# Patient Record
Sex: FEMALE | Race: WHITE | NOT HISPANIC OR LATINO | Employment: STUDENT | URBAN - METROPOLITAN AREA
[De-identification: names, ages, dates, MRNs, and addresses within clinical notes are randomized per-mention and may not be internally consistent; named-entity substitution may affect disease eponyms.]

---

## 2021-07-21 ENCOUNTER — TELEPHONE (OUTPATIENT)
Dept: NEUROLOGY | Facility: CLINIC | Age: 21
End: 2021-07-21

## 2021-07-21 NOTE — TELEPHONE ENCOUNTER
Best contact number for patient:  [phone]  Emergency Contact name and number:  [phone]  Referring provider and telephone number:  Self referral  Primary Care Provider Name and if affiliated with [facility]:     Reason for Appointment/Dx:migraines    Have you seen and followed up with a pediatric Neurologist for this disease in the past?      No (If yes ok to schedule with Dr Camacho Ellington)    Neurology Location patient would like to be seen:    Order received? No                                                 Records Received? No  If no, explain: Pt mom is getting records from Dr Sushant Mendez & PCP    Have you ever seen another Neurologist?       Yes, Dr Ham Tapia Name:    ID/Policy #:    Secondary Insurance:    ID/Policy#: Workman's Comp/ Accident/ School  Information      Workman's Comp/Accident/School related? No    If yes name of Insurance company:    Claim #:    Date of Injury:    Type of Injury:     Name and Telephone Number:    Notes:                   Appointment date: 3/30/22 @ 11am, w/Dr Erin Vazquez in [facility]  Verified insurance/address/phone-I made the pt chart  Sent appt details/directions  Mom will get neurology records & PCP records, I gave our fax number and indicated the info will all be needed

## 2021-08-26 ENCOUNTER — OFFICE VISIT (OUTPATIENT)
Dept: URGENT CARE | Facility: CLINIC | Age: 21
End: 2021-08-26
Payer: COMMERCIAL

## 2021-08-26 ENCOUNTER — ANNUAL EXAM (OUTPATIENT)
Dept: OBGYN CLINIC | Facility: CLINIC | Age: 21
End: 2021-08-26
Payer: COMMERCIAL

## 2021-08-26 VITALS
WEIGHT: 154 LBS | RESPIRATION RATE: 16 BRPM | OXYGEN SATURATION: 99 % | TEMPERATURE: 98.6 F | HEART RATE: 80 BPM | DIASTOLIC BLOOD PRESSURE: 68 MMHG | SYSTOLIC BLOOD PRESSURE: 106 MMHG | BODY MASS INDEX: 25.83 KG/M2

## 2021-08-26 VITALS
DIASTOLIC BLOOD PRESSURE: 78 MMHG | WEIGHT: 154.2 LBS | SYSTOLIC BLOOD PRESSURE: 108 MMHG | BODY MASS INDEX: 25.69 KG/M2 | HEIGHT: 65 IN

## 2021-08-26 DIAGNOSIS — L03.012 PARONYCHIA OF FINGER OF LEFT HAND: Primary | ICD-10-CM

## 2021-08-26 DIAGNOSIS — N94.6 DYSMENORRHEA: ICD-10-CM

## 2021-08-26 DIAGNOSIS — G43.901 MIGRAINE WITH STATUS MIGRAINOSUS, NOT INTRACTABLE, UNSPECIFIED MIGRAINE TYPE: ICD-10-CM

## 2021-08-26 DIAGNOSIS — Z30.011 ENCOUNTER FOR PRESCRIPTION OF ORAL CONTRACEPTIVES: ICD-10-CM

## 2021-08-26 DIAGNOSIS — F32.A DEPRESSION, UNSPECIFIED DEPRESSION TYPE: ICD-10-CM

## 2021-08-26 DIAGNOSIS — Z01.419 ENCOUNTER FOR GYNECOLOGICAL EXAMINATION WITHOUT ABNORMAL FINDING: Primary | ICD-10-CM

## 2021-08-26 DIAGNOSIS — L30.9 ECZEMA, UNSPECIFIED TYPE: ICD-10-CM

## 2021-08-26 PROCEDURE — 99395 PREV VISIT EST AGE 18-39: CPT | Performed by: NURSE PRACTITIONER

## 2021-08-26 PROCEDURE — G0382 LEV 3 HOSP TYPE B ED VISIT: HCPCS | Performed by: PHYSICIAN ASSISTANT

## 2021-08-26 RX ORDER — FLUOXETINE 10 MG/1
10 CAPSULE ORAL DAILY
Qty: 60 CAPSULE | Refills: 3 | Status: SHIPPED | OUTPATIENT
Start: 2021-08-26 | End: 2022-06-27

## 2021-08-26 RX ORDER — EPINEPHRINE 0.3 MG/.3ML
INJECTION SUBCUTANEOUS AS NEEDED
COMMUNITY
Start: 2021-08-23

## 2021-08-26 RX ORDER — NORETHINDRONE ACETATE/ETHINYL ESTRADIOL AND FERROUS FUMARATE 1MG-20(24)
1 KIT ORAL DAILY
COMMUNITY
Start: 2021-07-20 | End: 2021-08-26 | Stop reason: SDUPTHER

## 2021-08-26 RX ORDER — MONTELUKAST SODIUM 5 MG/1
1 TABLET, CHEWABLE ORAL
COMMUNITY
End: 2022-06-27

## 2021-08-26 RX ORDER — BECLOMETHASONE DIPROPIONATE HFA 80 UG/1
1 AEROSOL, METERED RESPIRATORY (INHALATION) DAILY
COMMUNITY
Start: 2021-08-24 | End: 2022-06-27

## 2021-08-26 RX ORDER — CETIRIZINE HYDROCHLORIDE 10 MG/1
1 TABLET ORAL DAILY
COMMUNITY

## 2021-08-26 RX ORDER — ELETRIPTAN HYDROBROMIDE 40 MG/1
1 TABLET, FILM COATED ORAL AS NEEDED
COMMUNITY
Start: 2021-07-30 | End: 2022-06-27

## 2021-08-26 RX ORDER — RIZATRIPTAN BENZOATE 10 MG/1
1 TABLET ORAL AS NEEDED
COMMUNITY
Start: 2021-06-16 | End: 2022-06-27

## 2021-08-26 RX ORDER — NORETHINDRONE ACETATE/ETHINYL ESTRADIOL AND FERROUS FUMARATE 1MG-20(24)
1 KIT ORAL DAILY
Qty: 84 TABLET | Refills: 3 | Status: SHIPPED | OUTPATIENT
Start: 2021-08-26 | End: 2022-02-20 | Stop reason: SDUPTHER

## 2021-08-26 RX ORDER — TRIAMCINOLONE ACETONIDE 5 MG/G
CREAM TOPICAL 3 TIMES DAILY
Qty: 30 G | Refills: 0 | Status: SHIPPED | OUTPATIENT
Start: 2021-08-26

## 2021-08-26 RX ORDER — CEPHALEXIN 500 MG/1
500 CAPSULE ORAL EVERY 8 HOURS SCHEDULED
Qty: 21 CAPSULE | Refills: 0 | Status: SHIPPED | OUTPATIENT
Start: 2021-08-26 | End: 2021-09-02

## 2021-08-26 NOTE — PATIENT INSTRUCTIONS
Pap smear to start at age 25 as per ASCCP guidelines  GC/CT cultures annually once sexually active, always condom use when sexually active, birth control as directed Use seat belt in every car ride, avoid smoking and alcohol use, exercise most days of the week, obtain appropriate nutrition and hydration, follow up with PCP for appropriate vaccine schedule    Monthly breast self exam to start at age 24  Moderate to severe depression  Some SI  With pt permission spoke with mom about PHQ 9 results  Discussed SSRI she wants to start To start 1 daily increase to 2 daily after 1 week  Recommend counseling at school  Discussed crisi intervention Going to ER or calling 911 with suicidal ideation   Return for follow up in 3 months

## 2021-08-26 NOTE — PROGRESS NOTES
[facility]        NAME: Catarina Mcdowell is a 24 y o  female  : 2000    MRN: [id number]  DATE: 2021  TIME: 6:01 PM    Assessment and Plan   Paronychia of finger of left hand [L03 012]  1  Paronychia of finger of left hand  cephalexin (KEFLEX) 500 mg capsule   2  Eczema, unspecified type  triamcinolone (KENALOG) 0 5 % cream         Patient Instructions      take antibiotic as directed until completed   use cream as directed  Follow up with PCP in 3-5 days  Proceed to  ER if symptoms worsen  Chief Complaint     Chief Complaint   Patient presents with    Finger Pain     began one week ago  base of 4th finger nail         History of Present Illness        23-year-old female presents with 2 complaints  First complaint is swollen left ring finger at the distal portion  Has been waxing waning for the past week  Has been doing soaks on it  Has expressed some drainage from the area  Denies any fevers or chills  Second complaint is flare-up of hand eczema  Has been using over-the-counter remedies with minimal relief  Rash  This is a new problem  The current episode started more than 1 year ago  The problem has been waxing and waning since onset  Location: Bilateral hands  The rash is characterized by itchiness  She was exposed to nothing  Pertinent negatives include no cough, fatigue or fever  Past treatments include topical steroids  The treatment provided no relief  Hand Pain   The incident occurred 5 to 7 days ago  The incident occurred at home  There was no injury mechanism  Pain location: Left ring finger  The quality of the pain is described as aching  The pain does not radiate  The pain is moderate  The pain has been fluctuating since the incident  Pertinent negatives include no chest pain, muscle weakness, numbness or tingling  The symptoms are aggravated by palpation and movement  She has tried rest for the symptoms  The treatment provided no relief  Review of Systems   Review of Systems   Constitutional: Negative  Negative for fatigue and fever  HENT: Negative  Eyes: Negative  Respiratory: Negative  Negative for cough  Cardiovascular: Negative  Negative for chest pain  Gastrointestinal: Negative  Musculoskeletal: Negative  Skin: Positive for rash  Neurological: Negative  Negative for tingling and numbness           Current Medications       Current Outpatient Medications:     cephalexin (KEFLEX) 500 mg capsule, Take 1 capsule (500 mg total) by mouth every 8 (eight) hours for 7 days, Disp: 21 capsule, Rfl: 0    cetirizine (ZyrTEC) 10 mg tablet, Take 1 tablet by mouth daily, Disp: , Rfl:     eletriptan (RELPAX) 40 MG tablet, Take 1 tablet by mouth as needed, Disp: , Rfl:     EPINEPHrine (EPIPEN) 0 3 mg/0 3 mL SOAJ, as needed, Disp: , Rfl:     FLUoxetine (PROzac) 10 mg capsule, Take 1 capsule (10 mg total) by mouth daily 1 now increase to 2 daily after 1 week, Disp: 60 capsule, Rfl: 3    Isaak 24 FE 1-20 MG-MCG(24) per tablet, Take 1 tablet by mouth daily, Disp: 84 tablet, Rfl: 3    mometasone (ASMANEX TWISTHALER) 220 MCG/INH inhaler, Inhale 1 puff every evening Rinse mouth after use , Disp: , Rfl:     montelukast (SINGULAIR) 5 mg chewable tablet, Chew 1 tablet daily at bedtime, Disp: , Rfl:     Qvar RediHaler 80 MCG/ACT inhaler, Inhale 1 puff daily, Disp: , Rfl:     rizatriptan (MAXALT) 10 MG tablet, Take 1 tablet by mouth as needed, Disp: , Rfl:     Topiramate ER 50 MG CP24, Take 1 capsule by mouth 2 (two) times a day, Disp: , Rfl:     triamcinolone (KENALOG) 0 5 % cream, Apply topically 3 (three) times a day, Disp: 30 g, Rfl: 0    Current Allergies     Allergies as of 08/26/2021 - Reviewed 08/26/2021   Allergen Reaction Noted    Peanut (diagnostic) - food allergy Anaphylaxis 08/26/2021            The following portions of the patient's history were reviewed and updated as appropriate: allergies, current medications, past family history, past medical history, past social history, past surgical history and problem list      Past Medical History:   Diagnosis Date    Asthma     Migraines        History reviewed  No pertinent surgical history  History reviewed  No pertinent family history  Medications have been verified  Objective   /68   Pulse 80   Temp 98 6 °F (37 °C)   Resp 16   Wt 69 9 kg (154 lb)   LMP 08/08/2021 (Approximate)   SpO2 99%   BMI 25 83 kg/m²   Patient's last menstrual period was 08/08/2021 (approximate)  Physical Exam     Physical Exam  Vitals and nursing note reviewed  Constitutional:       General: She is not in acute distress  Appearance: She is well-developed  HENT:      Head: Normocephalic and atraumatic  Right Ear: Hearing, tympanic membrane, ear canal and external ear normal       Left Ear: Hearing, tympanic membrane, ear canal and external ear normal       Nose: Nose normal       Mouth/Throat:      Pharynx: Uvula midline  No oropharyngeal exudate  Eyes:      General:         Right eye: No discharge  Left eye: No discharge  Conjunctiva/sclera: Conjunctivae normal    Cardiovascular:      Rate and Rhythm: Normal rate and regular rhythm  Heart sounds: Normal heart sounds  No murmur heard  Pulmonary:      Effort: Pulmonary effort is normal  No respiratory distress  Breath sounds: Normal breath sounds  No wheezing or rales  Abdominal:      General: Bowel sounds are normal       Palpations: Abdomen is soft  Tenderness: There is no abdominal tenderness  Musculoskeletal:         General: Normal range of motion  Left hand: Swelling and tenderness present  No deformity or lacerations  Normal range of motion  Hands:       Cervical back: Normal range of motion and neck supple  Lymphadenopathy:      Cervical: No cervical adenopathy  Skin:     General: Skin is warm and dry        Findings: Erythema ( to distal phalanx on left ring finger) and rash ( eczema looking rash on bilateral hands) present  Neurological:      Mental Status: She is alert and oriented to person, place, and time

## 2021-08-26 NOTE — PROGRESS NOTES
Assessment/Plan:  Pap smear to start at age 25 as per ASCCP guidelines  GC/CT cultures annually once sexually active, always condom use when sexually active, birth control as directed Use seat belt in every car ride, avoid smoking and alcohol use, exercise most days of the week, obtain appropriate nutrition and hydration, follow up with PCP for appropriate vaccine schedule  Calcium 1300 mg per day to age 25  Age 24-51 calcium 1000mg daily intake  Vit D daily recommended  Monthly breast self exam to start at age 24  Diagnoses and all orders for this visit:    Encounter for gynecological examination without abnormal finding    Encounter for prescription of oral contraceptives  -     Isaak 24 FE 1-20 MG-MCG(24) per tablet; Take 1 tablet by mouth daily    Dysmenorrhea  Comments:  improved on OCP    Migraine with status migrainosus, not intractable, unspecified migraine type  Comments:  no aura    Depression, unspecified depression type  -     FLUoxetine (PROzac) 10 mg capsule; Take 1 capsule (10 mg total) by mouth daily 1 now increase to 2 daily after 1 week    Other orders  -     rizatriptan (MAXALT) 10 MG tablet; Take 1 tablet by mouth as needed  -     Discontinue: Isaak 24 FE 1-20 MG-MCG(24) per tablet; Take 1 tablet by mouth daily  -     cetirizine (ZyrTEC) 10 mg tablet; Take 1 tablet by mouth daily  -     montelukast (SINGULAIR) 5 mg chewable tablet; Chew 1 tablet daily at bedtime  -     mometasone (ASMANEX TWISTHALER) 220 MCG/INH inhaler; Inhale 1 puff every evening Rinse mouth after use  -     Topiramate ER 50 MG CP24; Take 1 capsule by mouth 2 (two) times a day  -     EPINEPHrine (EPIPEN) 0 3 mg/0 3 mL SOAJ; as needed  -     eletriptan (RELPAX) 40 MG tablet; Take 1 tablet by mouth as needed  -     Qvar RediHaler 80 MCG/ACT inhaler; Inhale 1 puff daily          Subjective:      Patient ID: Lexi Isbell is a 24 y o  female  Here for annual gyn Skips periods no spotting or BTB    Denies pain Bowel and bladder are normal  No unusual discharge, Never sexually active  Mood is off depressed and anxious  Not on meds  Has been feeling off awhile  Leaves to go back to college on Sat Started way before that  [employer]  When younger bad anxiety and now on autopiolet Does not feel herself  PHQ 9 Mod to severe depressoin  Supportive  Group of friends at school  Has had suicidal ideation  Agreeable to start antidepressants  PCP Dr Stevie Keita  The following portions of the patient's history were reviewed and updated as appropriate: allergies, current medications, past family history, past medical history, past social history, past surgical history and problem list     Review of Systems   Constitutional: Negative for fatigue and unexpected weight change  Gastrointestinal: Negative for abdominal distention, abdominal pain, constipation and diarrhea  Genitourinary: Negative for difficulty urinating, dyspareunia, dysuria, frequency, genital sores, menstrual problem, pelvic pain, urgency, vaginal bleeding, vaginal discharge and vaginal pain  Neurological: Negative for headaches  Psychiatric/Behavioral: Positive for dysphoric mood and suicidal ideas  The patient is nervous/anxious  Objective:      /78 (BP Location: Left arm, Patient Position: Sitting, Cuff Size: Large)   Ht 5' 4 75" (1 645 m)   Wt 69 9 kg (154 lb 3 2 oz)   LMP 08/08/2021 (Approximate)   Breastfeeding No   BMI 25 86 kg/m²          Physical Exam  Constitutional:       Appearance: Normal appearance  HENT:      Head: Normocephalic and atraumatic  Pulmonary:      Effort: Pulmonary effort is normal    Chest:      Breasts: Adiel Score is 5  Breasts are symmetrical          Right: Normal  No mass, nipple discharge, skin change or tenderness  Left: Normal  No mass, nipple discharge, skin change or tenderness  Abdominal:      General: There is no distension  Palpations: Abdomen is soft  There is no mass  Tenderness: There is no abdominal tenderness  Lymphadenopathy:      Upper Body:      Right upper body: No axillary adenopathy  Left upper body: No axillary adenopathy  Neurological:      General: No focal deficit present  Mental Status: She is alert and oriented to person, place, and time     Psychiatric:         Mood and Affect: Mood normal          Behavior: Behavior normal

## 2021-08-26 NOTE — PATIENT INSTRUCTIONS
take antibiotic as directed until completed   use cream as directed  Follow up with PCP in 3-5 days  Proceed to  ER if symptoms worsen  Paronychia   WHAT YOU NEED TO KNOW:   Paronychia is an infection of your nail fold caused by bacteria or a fungus  The nail fold is the skin around your nail  Paronychia may happen suddenly and last for 6 weeks or longer  You may have paronychia on more than 1 finger or toe  DISCHARGE INSTRUCTIONS:   Medicines:   · Td vaccine  is a booster shot used to help prevent tetanus and diphtheria  The Td booster may be given to adolescents and adults every 10 years or for certain wounds and injuries  · Antibiotics: This medicine will help fight or prevent an infection  It may be given as a pill, cream, or ointment  · Steroids: This medicine will help decrease inflammation  It may be given as a pill, cream, or ointment  · Antifungal medicine: This medicine helps kill fungus that may be causing your infection  It may be given as a cream or ointment  · NSAIDs:  These medicines decrease pain and swelling  NSAIDs are available without a doctor's order  Ask your healthcare provider which medicine is right for you  Ask how much to take and when to take it  Take as directed  NSAIDs can cause stomach bleeding and kidney problems if not taken correctly  · Take your medicine as directed  Contact your healthcare provider if you think your medicine is not helping or if you have side effects  Tell him of her if you are allergic to any medicine  Keep a list of the medicines, vitamins, and herbs you take  Include the amounts, and when and why you take them  Bring the list or the pill bottles to follow-up visits  Carry your medicine list with you in case of an emergency  Follow up with your healthcare provider as directed:  Write down your questions so you remember to ask them during your visits     Self-care:   · Soak your nail:  Soak your nail in a mixture of equal parts vinegar and water 3 or 4 times each day  This will help decrease inflammation  · Apply a warm compress:  Soak a washcloth in warm water and place it on your nail  This will help decrease inflammation  · Elevate:  Raise your nail above the level of your heart as often as you can  This will help decrease swelling and pain  Prop your nail on pillows or blankets to keep it elevated comfortably  · Use lotion:  Apply lotion after you wash your hands  This will prevent your skin from becoming too dry  Prevent paronychia:   · Avoid chemicals and allergens that may harm your skin and nails  This includes soaps, laundry detergents, and nail products  · Keep your nails clean and dry  Avoid soaking your nails in water  Use cotton-lined rubber gloves or wear 2 rubber gloves if you work with food or water  The gloves will help protect your nail folds  · Keep your nails short  Do not bite your nails, pick at your hangnails, suck your fingers, or wear fake nails  Bring your own nail tools when you go to the nail salon  Contact your healthcare provider if:   · Your nail becomes loose, deformed, or falls off  · You have a large abscess on your nail  · You have questions or concerns about your condition or care  Return to the emergency department if:   · You have severe nail pain  · The inflammation spreads to your hand or arm  © Copyright [address] 2021 Information is for End User's use only and may not be sold, redistributed or otherwise used for commercial purposes  All illustrations and images included in CareNotes® are the copyrighted property of [employer] M [employer] Inc  or [employer]  The above information is an  only  It is not intended as medical advice for individual conditions or treatments  Talk to your doctor, nurse or pharmacist before following any medical regimen to see if it is safe and effective for you        Eczema   AMBULATORY CARE:   Eczema  is an itchy, red skin rash  You are more likely to have it if your parent or a family member has eczema, asthma, or hay fever  Eczema is a long-term condition  You may have flare-ups from time to time for the rest of your life  Signs and symptoms:   · Patches of dry, red, itchy skin    · Bumps or blisters that crust over or ooze clear fluid    · Areas of skin that are thick, scaly, or hard and leather-like    Eczema triggers:  Anything that increases dryness or makes you want to scratch is a trigger  Triggers may cause eczema to flare up  The following are common triggers:  · Frequent baths or showers  can lead to dry, itchy skin  · Sudden temperature changes , such as cold air, dries out your skin  Heat can increase sweating  Both can make you itch  · Allergens  such as dust mites and pet dander can make your symptoms worse  Pollen, mold, and cigarette smoke may also irritate your skin  · Some kinds of soap, makeup, and household   may bother your skin  Ask your healthcare provider about mild products you can use  · Stress  may cause your eczema to get worse  Seek care immediately if:   · You develop a fever or have red streaks going up your arm or leg  · Your rash gets more swollen, red, or hot  Call your doctor if:   · Most of your skin is red, swollen, painful, and covered with scales  · You develop bloody, red, painful crusts  · Your skin blisters and oozes white or yellow pus  · You have questions or concerns about your condition or care  Treatment for eczema  is aimed at reducing pain and itching, and adding moisture to your skin  Your symptoms should improve after 3 weeks of treatment  There is no cure for eczema  You may need the following:  · Medicines may help reduce itching, redness, pain, and swelling  They may be given as a cream or pill  You may also receive antibiotics if you have a skin infection  · Phototherapy , or light therapy, may help heal your skin      Care for your skin:   · Do not scratch  Pat or press on your skin to relieve itching  Your symptoms will get worse if you scratch  Keep your fingernails short so you do not tear your skin if you do scratch  · Keep your skin moist   Rub lotion, cream, or ointment into your skin at least 2 times a day  Ask your healthcare provider what to use and how often to use it  · Take baths or showers  with warm water for 10 minutes or less  Use mild bar soap  Ask your healthcare provider for the best soap for you to use  · Wear cotton clothes  Wear loose-fitting clothes made from cotton or cotton blends  Avoid wool  · Use a humidifier  to add moisture to the air in your home  · Avoid changes in temperature , especially activities that cause you to sweat a lot  Sweat can cause itching  Remove blankets from your bed if you get hot while you sleep  · Avoid allergens, dust, and skin irritants  Do not use perfume, fabric softener, or makeup that burns or itches  Follow up with your doctor as directed:  Write down your questions so you remember to ask them during your visits  © Copyright [employer] 2021 Information is for End User's use only and may not be sold, redistributed or otherwise used for commercial purposes  All illustrations and images included in CareNotes® are the copyrighted property of A D [employer] M [employer] Inc  or [employer]  The above information is an  only  It is not intended as medical advice for individual conditions or treatments  Talk to your doctor, nurse or pharmacist before following any medical regimen to see if it is safe and effective for you

## 2022-02-20 DIAGNOSIS — Z30.011 ENCOUNTER FOR PRESCRIPTION OF ORAL CONTRACEPTIVES: ICD-10-CM

## 2022-02-22 ENCOUNTER — TELEPHONE (OUTPATIENT)
Dept: NEUROLOGY | Facility: CLINIC | Age: 22
End: 2022-02-22

## 2022-02-22 RX ORDER — NORETHINDRONE ACETATE/ETHINYL ESTRADIOL AND FERROUS FUMARATE 1MG-20(24)
1 KIT ORAL DAILY
Qty: 84 TABLET | Refills: 0 | Status: SHIPPED | OUTPATIENT
Start: 2022-02-22 | End: 2022-04-19 | Stop reason: SDUPTHER

## 2022-02-22 NOTE — TELEPHONE ENCOUNTER
LVMM explaining Dr Agusto Olmedo not be in office on 3/30 and we need to reschedule appt   Gave direct # for today and central # for return call to reschedule      Called Mother and left VMM as above

## 2022-03-02 ENCOUNTER — TELEPHONE (OUTPATIENT)
Dept: NEUROLOGY | Facility: CLINIC | Age: 22
End: 2022-03-02

## 2022-03-02 NOTE — TELEPHONE ENCOUNTER
Called and left message for patient appt was rescheduled for 3/10/2022 @1:00pm with Eusebia Mckeon in [facility] Aria Marcus would not be in office   Gave central # if unable to keep appt

## 2022-03-03 ENCOUNTER — TELEPHONE (OUTPATIENT)
Dept: NEUROLOGY | Facility: CLINIC | Age: 22
End: 2022-03-03

## 2022-03-03 NOTE — TELEPHONE ENCOUNTER
Called and left a voicemail for patient - Please call back to confirm upcoming appointment with [facility]  Provided patient with apt date, time and location  Informed patient that check in is at least 15 minutes prior to apt time

## 2022-04-19 DIAGNOSIS — Z30.011 ENCOUNTER FOR PRESCRIPTION OF ORAL CONTRACEPTIVES: ICD-10-CM

## 2022-04-19 RX ORDER — NORETHINDRONE ACETATE/ETHINYL ESTRADIOL AND FERROUS FUMARATE 1MG-20(24)
1 KIT ORAL DAILY
Qty: 84 TABLET | Refills: 0 | Status: SHIPPED | OUTPATIENT
Start: 2022-04-19

## 2022-06-18 LAB — EXT SARS-COV-2: NEGATIVE

## 2022-06-27 ENCOUNTER — OFFICE VISIT (OUTPATIENT)
Dept: FAMILY MEDICINE CLINIC | Facility: CLINIC | Age: 22
End: 2022-06-27
Payer: COMMERCIAL

## 2022-06-27 VITALS
DIASTOLIC BLOOD PRESSURE: 80 MMHG | TEMPERATURE: 99.9 F | WEIGHT: 171 LBS | OXYGEN SATURATION: 95 % | HEIGHT: 66 IN | SYSTOLIC BLOOD PRESSURE: 122 MMHG | HEART RATE: 123 BPM | BODY MASS INDEX: 27.48 KG/M2

## 2022-06-27 DIAGNOSIS — Z13.29 SCREENING FOR THYROID DISORDER: ICD-10-CM

## 2022-06-27 DIAGNOSIS — Z13.0 SCREENING FOR DEFICIENCY ANEMIA: ICD-10-CM

## 2022-06-27 DIAGNOSIS — G43.109 MIGRAINE WITH AURA AND WITHOUT STATUS MIGRAINOSUS, NOT INTRACTABLE: ICD-10-CM

## 2022-06-27 DIAGNOSIS — F41.9 ANXIETY: ICD-10-CM

## 2022-06-27 DIAGNOSIS — J30.9 ALLERGIC RHINITIS, UNSPECIFIED SEASONALITY, UNSPECIFIED TRIGGER: ICD-10-CM

## 2022-06-27 DIAGNOSIS — J45.40 MODERATE PERSISTENT ASTHMA WITHOUT COMPLICATION: ICD-10-CM

## 2022-06-27 DIAGNOSIS — Z13.1 SCREENING FOR DIABETES MELLITUS: ICD-10-CM

## 2022-06-27 DIAGNOSIS — Z00.00 ANNUAL PHYSICAL EXAM: Primary | ICD-10-CM

## 2022-06-27 DIAGNOSIS — Z13.6 SCREENING FOR CARDIOVASCULAR CONDITION: ICD-10-CM

## 2022-06-27 DIAGNOSIS — F32.A DEPRESSION, UNSPECIFIED DEPRESSION TYPE: ICD-10-CM

## 2022-06-27 PROCEDURE — 99385 PREV VISIT NEW AGE 18-39: CPT | Performed by: FAMILY MEDICINE

## 2022-06-27 RX ORDER — ALBUTEROL SULFATE 90 UG/1
AEROSOL, METERED RESPIRATORY (INHALATION)
COMMUNITY
Start: 2022-06-27

## 2022-06-27 RX ORDER — BUPROPION HYDROCHLORIDE 300 MG/1
300 TABLET ORAL EVERY MORNING
COMMUNITY
Start: 2022-06-24

## 2022-06-27 RX ORDER — BUDESONIDE AND FORMOTEROL FUMARATE DIHYDRATE 160; 4.5 UG/1; UG/1
AEROSOL RESPIRATORY (INHALATION)
COMMUNITY
Start: 2022-06-27

## 2022-06-27 RX ORDER — ARIPIPRAZOLE 5 MG/1
5 TABLET ORAL EVERY MORNING
COMMUNITY
Start: 2022-06-24

## 2022-06-27 RX ORDER — DULOXETIN HYDROCHLORIDE 60 MG/1
60 CAPSULE, DELAYED RELEASE ORAL DAILY
COMMUNITY
Start: 2022-06-24

## 2022-06-27 RX ORDER — TOPIRAMATE 50 MG/1
50 TABLET, FILM COATED ORAL 2 TIMES DAILY
COMMUNITY
Start: 2022-05-31

## 2022-06-27 RX ORDER — HYDROXYZINE 50 MG/1
TABLET, FILM COATED ORAL
COMMUNITY
Start: 2022-06-24

## 2022-06-27 NOTE — PATIENT INSTRUCTIONS

## 2022-06-27 NOTE — PROGRESS NOTES
ADULT ANNUAL [facility]    NAME: Micah Madden  AGE: 22 y o  SEX: female  : 2000     DATE: 2022     Assessment and Plan:     Problem List Items Addressed This Visit        Cardiovascular and Mediastinum    Migraine with aura and without status migrainosus, not intractable    Relevant Medications    buPROPion (WELLBUTRIN XL) 300 mg 24 hr tablet    DULoxetine (CYMBALTA) 60 mg delayed release capsule    topiramate (TOPAMAX) 50 MG tablet    Other Relevant Orders    Ambulatory Referral to Neurology    Patient follows with Dr Jewel Diaz and was recently there to see him  Taking topamax for migraine prophylaxis  She states that he changed her abortive medication but not sure what the name of her medication is  She has not filled prescription  Will call for records          Other    Depression    Relevant Medications    ARIPiprazole (ABILIFY) 5 mg tablet    buPROPion (WELLBUTRIN XL) 300 mg 24 hr tablet    DULoxetine (CYMBALTA) 60 mg delayed release capsule    hydrOXYzine HCL (ATARAX) 50 mg tablet  Follows with psychiatry in [place] [address]  She is stable on current meds  Anxiety  Stable  Asthma  Patient follows with allergist and had visit recently (since her pneumonia diagnosis)  States that allergist increased her symbicort dose to 2 puffs bid and gave her spacer to use with her inhaler  Will obtain copy of her records from their office  Other Visit Diagnoses     Annual physical exam    -  Primary  Healthy young adult  We discussed importance of healthy diet and exercise  Encouraged her to get out and do a little walking this summer  No immunization records available for review but she states that they are up to date  Will obtain from pediatrician office  She received covid vaccine at local pharmacy  She declines hep c and HIV screening  She is agreeable to having lipid panel done     Requests cbc to screen for anemia  Screening for cardiovascular condition        Relevant Orders    Lipid panel    Screening for deficiency anemia        Relevant Orders    CBC and differential    Screening for diabetes mellitus        Relevant Orders    Comprehensive metabolic panel    Screening for thyroid disorder        Relevant Orders    TSH, 3rd generation with Free T4 reflex          Immunizations and preventive care screenings were discussed with patient today  Appropriate education was printed on patient's after visit summary  Counseling:  Exercise: the importance of regular exercise/physical activity was discussed  Recommend exercise 3-5 times per week for at least 30 minutes  Return in about 1 year (around 6/27/2023) for Annual physical      Chief Complaint:     Chief Complaint   Patient presents with   [address] [place] [address] Patient Visit      History of Present Illness:     Adult Annual Physical   Patient here as a new patient for a comprehensive physical exam  The patient reports no problems  Previous PCP was her pediatrician     She goes to [employer]  Entering senior year  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    Has depression and anxiety  States that she was diagnosed in the fall of 2021  Sees psychiatrist in [address] [place] [address]  Currently on wellbutrin   Mg daily, abilify 5 mg daily, and cymbalta 60 mg daily  Doing okay  Does have f/u appt scheduled  PHQ-2/9 Depression Screening    Little interest or pleasure in doing things: 1 - several days  Feeling down, depressed, or hopeless: 1 - several days  PHQ-2 Score: 2  PHQ-2 Interpretation: Negative depression screen       KOKI-7 Flowsheet Screening    Flowsheet Row Most Recent Value   Over the last 2 weeks, how often have you been bothered by any of the following problems?     Feeling nervous, anxious, or on edge 0   Not being able to stop or control worrying 0   Worrying too much about different things 1   Trouble relaxing 1   Being so restless that it is hard to sit still 0   Becoming easily annoyed or irritable 2   Feeling afraid as if something awful might happen 2   KOKI-7 Total Score 6            Has asthma and allergies  Sees asthma and allergy specialists in [place]  Currently on zyrtec for her perennial allergic rhinitis  On symbicort bid and has albuterol inhaler  She last used her albuterol inhaler today  She states that she is just getting over pneumonia  Seen at urgent care in [place] and diagnosed based on imaging  No record of this visit for review today  Will call for that progress note along with xray report  Is feeling better  Still some cough and is a little short of breath  covid test was negative  She just saw allergist who gave her spacer and upped her symbicort to 2 puffs bid     Has migraines  Diagnosed at age 17  Gets aura (squiggly vision) prior to onset of her headaches  I saw that she had appt scheduled with [facility] neurology on 3/30/22 but this was "canceled by provider"  She does have neurologist who she has been seeing--Dr Drew Reynolds  Just had an appt  She is taking topamax 50 mg bid for headache prophylaxis  She is not sure what her abortive medication is but states that it is new  She has not taken it yet because insurance would not cover  She has not let neurology know that med was not covered  She will need referral to new [facility] neurology as mother works for [email or web address]    She has no concerns with her health  Thinks immunizations are up to date  She had covid vaccine at pharmacy  She has never had cholesterol checked and is agreeable to having this done for screening purposes  She would like to be screened for anemia  No prior history of anemia  Does have history of heavy periods  Diet and Physical Activity  Diet/Nutrition: eats healthy when she is at home  admits to not eating the healthiest as school  Exercise: no formal exercise        Depression Screening  PHQ-2/9 Depression Screening    Little interest or pleasure in doing things: 1 - several days  Feeling down, depressed, or hopeless: 1 - several days  PHQ-2 Score: 2  PHQ-2 Interpretation: Negative depression screen       General Health  Sleep: sleeps well  Hearing: normal - bilateral   Vision: no vision problems  Dental: regular dental visits  /GYN Health  Last menstrual period: may 2022  She states that she often will skp placebo week of her oral contraceptives  On them for heavy menses  Not sexually active  Contraceptive method: oral contraceptives  History of STDs?: no      Review of Systems:     Review of Systems   Past Medical History:     Past Medical History:   Diagnosis Date    Anxiety     Asthma     Depression     Migraines       Past Surgical History:     History reviewed  No pertinent surgical history     Social History:     Social History     Socioeconomic History    Marital status: Single     Spouse name: None    Number of children: None    Years of education: None    Highest education level: None   Occupational History    Occupation: Student   Tobacco Use    Smoking status: Never Smoker    Smokeless tobacco: Never Used   Vaping Use    Vaping Use: Never used   Substance and Sexual Activity    Alcohol use: Never     Comment: Does not drink alcohol - As per [employer] Person     Drug use: Never     Comment: No - As per [employer]     Sexual activity: Never   Other Topics Concern    None   Social History Narrative    Sexual abuse: No    Exercise: Occasionally    Domestic violence: No     History of drug/alcohol abuse: Denies    Lives with parents    Is a college student at [employer]     Social Determinants of Health     Financial Resource Strain: Not on file   Food Insecurity: Not on file   Transportation Needs: Not on file   Physical Activity: Not on file   Stress: Not on file   Social Connections: Not on file   Intimate Partner Violence: Not on file   Housing Stability: Not on file      Family History:     Family History Problem Relation Age of Onset    Hypertension Mother     Hypertension Maternal Grandmother     Heart disease Maternal Grandfather     Hypertension Maternal Grandfather     Diabetes Paternal Grandfather       Current Medications:     Current Outpatient Medications   Medication Sig Dispense Refill    albuterol (PROVENTIL HFA,VENTOLIN HFA) 90 mcg/act inhaler       ARIPiprazole (ABILIFY) 5 mg tablet Take 5 mg by mouth every morning      buPROPion (WELLBUTRIN XL) 300 mg 24 hr tablet Take 300 mg by mouth every morning      cetirizine (ZyrTEC) 10 mg tablet Take 1 tablet by mouth daily      DULoxetine (CYMBALTA) 60 mg delayed release capsule Take 60 mg by mouth daily      EPINEPHrine (EPIPEN) 0 3 mg/0 3 mL SOAJ as needed      hydrOXYzine HCL (ATARAX) 50 mg tablet TAKE 1 TABLET BY MOUTH EVERY DAY AT NIGHT      Isaak 24 FE 1-20 MG-MCG(24) per tablet Take 1 tablet by mouth daily 84 tablet 0    Symbicort 160-4 5 MCG/ACT inhaler       topiramate (TOPAMAX) 50 MG tablet Take 50 mg by mouth 2 (two) times a day      triamcinolone (KENALOG) 0 5 % cream Apply topically 3 (three) times a day 30 g 0     No current facility-administered medications for this visit  Allergies: Allergies   Allergen Reactions    Peanut (Diagnostic) - Food Allergy Anaphylaxis      Physical Exam:     /80 (BP Location: Left arm, Patient Position: Sitting, Cuff Size: Adult)   Pulse (!) 123   Temp 99 9 °F (37 7 °C) (Tympanic)   Ht 5' 5 5" (1 664 m)   Wt 77 6 kg (171 lb)   LMP 04/25/2022 (Approximate)   SpO2 95%   BMI 28 02 kg/m²     Physical Exam  Vitals and nursing note reviewed  Constitutional:       General: She is not in acute distress  Appearance: Normal appearance  She is not ill-appearing, toxic-appearing or diaphoretic  HENT:      Head: Normocephalic and atraumatic  Neck:      Vascular: No carotid bruit  Comments: No thyromegaly  Cardiovascular:      Rate and Rhythm: Regular rhythm   Tachycardia present  Heart sounds: No murmur heard  Pulmonary:      Effort: Pulmonary effort is normal       Breath sounds: Wheezing (faint wheezes on expiration right base) present  Abdominal:      General: Bowel sounds are normal  There is no distension  Palpations: Abdomen is soft  There is no mass  Tenderness: There is no abdominal tenderness  Musculoskeletal:         General: Normal range of motion  Cervical back: Normal range of motion and neck supple  Comments: No scoliosis   Lymphadenopathy:      Cervical: No cervical adenopathy  Skin:     General: Skin is warm and dry  Findings: No rash  Neurological:      General: No focal deficit present  Mental Status: She is alert and oriented to person, place, and time        Deep Tendon Reflexes: Reflexes normal       Comments: DTR patellar +3/4 bilaterally   Psychiatric:         Mood and Affect: Mood normal           Gino Loser, DO   [address] [facility]

## 2022-08-17 DIAGNOSIS — F32.A DEPRESSION, UNSPECIFIED DEPRESSION TYPE: Primary | ICD-10-CM

## 2022-08-17 DIAGNOSIS — F41.9 ANXIETY: ICD-10-CM

## 2022-08-17 RX ORDER — BUPROPION HYDROCHLORIDE 300 MG/1
300 TABLET ORAL EVERY MORNING
Qty: 30 TABLET | Refills: 0 | Status: SHIPPED | OUTPATIENT
Start: 2022-08-17 | End: 2022-09-13

## 2022-08-17 RX ORDER — HYDROXYZINE 50 MG/1
50 TABLET, FILM COATED ORAL
Qty: 30 TABLET | Refills: 0 | Status: SHIPPED | OUTPATIENT
Start: 2022-08-17 | End: 2022-09-13

## 2022-08-17 NOTE — TELEPHONE ENCOUNTER
Pt's mother is aware that the requested rx were approved (30 day supply) and sent to the preferred pharmacy  Pt's mother was advised that pt will need to schedule a 1 month follow-up to evaluate the anxiety and depression  Pt's mother advised that the f/iu can be VV from school  Pt's mother stated that pt will the office to schedule  Awaiting call from pt

## 2022-08-17 NOTE — TELEPHONE ENCOUNTER
I left a VM to follow-up on a VM left on our office line form pt's mother requesting that Dr Stas Lindquist take over pt's rx  Pt's mother stated that pt is in between psychiatrists at this time, but needs refills  I left a VM requesting a call back from pt's mother for more details regarding this request     Awaiting call back

## 2022-08-17 NOTE — TELEPHONE ENCOUNTER
I spoke with the pt's mother regarding the requested refills  Pt is not yet scheduled with a new psychiatrist      Pt goes to school in [place] and had previously been under the care of a psychiatrist near [place]  Previous presriber is no longer with that practice  Additionally, pt's mother stated that she was not happy with the quality of care delivered by that practice and would like to find a new practitioner and practice for pt  Pt is onlly taking Wellbutrin (x1year) and   & Hydroxyzine for psychiatric tx  Pt no longer uses Cymbalta & Abilify  Pt's mother requests that CM refill Wellbutrin and Hydroxyzine in the interim, while pt finds a new practitioner, if possible  Please review and advise

## 2022-08-17 NOTE — TELEPHONE ENCOUNTER
Noted  Will refill medications as requested for one month  She should make f/u appt here in 1 month to evaluate her depression/anxiety  If she finds psychiatrist in meanwhile, okay to just f/u with them

## 2022-08-18 ENCOUNTER — TELEMEDICINE (OUTPATIENT)
Dept: FAMILY MEDICINE CLINIC | Facility: CLINIC | Age: 22
End: 2022-08-18
Payer: COMMERCIAL

## 2022-08-18 VITALS — BODY MASS INDEX: 27.48 KG/M2 | WEIGHT: 171 LBS | HEIGHT: 66 IN

## 2022-08-18 DIAGNOSIS — F41.9 ANXIETY: ICD-10-CM

## 2022-08-18 DIAGNOSIS — E03.9 ACQUIRED HYPOTHYROIDISM: Primary | ICD-10-CM

## 2022-08-18 DIAGNOSIS — F32.A DEPRESSION, UNSPECIFIED DEPRESSION TYPE: ICD-10-CM

## 2022-08-18 DIAGNOSIS — R74.8 ELEVATED LIVER ENZYMES: ICD-10-CM

## 2022-08-18 LAB
ALBUMIN SERPL-MCNC: 4.8 G/DL (ref 3.9–5)
ALBUMIN/GLOB SERPL: 2.2 {RATIO} (ref 1.2–2.2)
ALP SERPL-CCNC: 54 IU/L (ref 42–106)
ALT SERPL-CCNC: 44 IU/L (ref 0–32)
AST SERPL-CCNC: 35 IU/L (ref 0–40)
BASOPHILS # BLD AUTO: 0.1 X10E3/UL (ref 0–0.2)
BASOPHILS NFR BLD AUTO: 1 %
BILIRUB SERPL-MCNC: 0.3 MG/DL (ref 0–1.2)
BUN SERPL-MCNC: 12 MG/DL (ref 6–20)
BUN/CREAT SERPL: 14 (ref 9–23)
CALCIUM SERPL-MCNC: 10 MG/DL (ref 8.7–10.2)
CHLORIDE SERPL-SCNC: 105 MMOL/L (ref 96–106)
CHOLEST SERPL-MCNC: 195 MG/DL (ref 100–199)
CO2 SERPL-SCNC: 20 MMOL/L (ref 20–29)
CREAT SERPL-MCNC: 0.87 MG/DL (ref 0.57–1)
EGFR: 98 ML/MIN/1.73
EOSINOPHIL # BLD AUTO: 0.2 X10E3/UL (ref 0–0.4)
EOSINOPHIL NFR BLD AUTO: 2 %
ERYTHROCYTE [DISTWIDTH] IN BLOOD BY AUTOMATED COUNT: 11.8 % (ref 11.7–15.4)
GLOBULIN SER-MCNC: 2.2 G/DL (ref 1.5–4.5)
GLUCOSE SERPL-MCNC: 84 MG/DL (ref 65–99)
HCT VFR BLD AUTO: 46.3 % (ref 34–46.6)
HDLC SERPL-MCNC: 62 MG/DL
HGB BLD-MCNC: 15.8 G/DL (ref 11.1–15.9)
IMM GRANULOCYTES # BLD: 0 X10E3/UL (ref 0–0.1)
IMM GRANULOCYTES NFR BLD: 0 %
LDLC SERPL CALC-MCNC: 110 MG/DL (ref 0–99)
LYMPHOCYTES # BLD AUTO: 3.1 X10E3/UL (ref 0.7–3.1)
LYMPHOCYTES NFR BLD AUTO: 32 %
MCH RBC QN AUTO: 29.9 PG (ref 26.6–33)
MCHC RBC AUTO-ENTMCNC: 34.1 G/DL (ref 31.5–35.7)
MCV RBC AUTO: 88 FL (ref 79–97)
MONOCYTES # BLD AUTO: 0.6 X10E3/UL (ref 0.1–0.9)
MONOCYTES NFR BLD AUTO: 6 %
NEUTROPHILS # BLD AUTO: 5.6 X10E3/UL (ref 1.4–7)
NEUTROPHILS NFR BLD AUTO: 59 %
PLATELET # BLD AUTO: 300 X10E3/UL (ref 150–450)
POTASSIUM SERPL-SCNC: 4 MMOL/L (ref 3.5–5.2)
PROT SERPL-MCNC: 7 G/DL (ref 6–8.5)
RBC # BLD AUTO: 5.28 X10E6/UL (ref 3.77–5.28)
SL AMB T4, FREE (DIRECT): 1.07 NG/DL (ref 0.82–1.77)
SL AMB VLDL CHOLESTEROL CALC: 23 MG/DL (ref 5–40)
SODIUM SERPL-SCNC: 140 MMOL/L (ref 134–144)
TRIGL SERPL-MCNC: 134 MG/DL (ref 0–149)
TSH SERPL DL<=0.005 MIU/L-ACNC: 7.35 UIU/ML (ref 0.45–4.5)
WBC # BLD AUTO: 9.6 X10E3/UL (ref 3.4–10.8)

## 2022-08-18 PROCEDURE — 99214 OFFICE O/P EST MOD 30 MIN: CPT | Performed by: FAMILY MEDICINE

## 2022-08-18 RX ORDER — IMIPRAMINE HYDROCHLORIDE 25 MG/1
1 TABLET ORAL 2 TIMES DAILY
COMMUNITY
Start: 2022-06-27

## 2022-08-18 RX ORDER — LEVOTHYROXINE SODIUM 0.03 MG/1
25 TABLET ORAL DAILY
Qty: 90 TABLET | Refills: 1 | Status: SHIPPED | OUTPATIENT
Start: 2022-08-18

## 2022-08-18 NOTE — PATIENT INSTRUCTIONS
Start synthroid 25 mcg once daily  Take in AM on an empty stomach with no other medications  Recheck thyroid labs in about 6-8 weeks

## 2022-09-13 ENCOUNTER — TELEMEDICINE (OUTPATIENT)
Dept: FAMILY MEDICINE CLINIC | Facility: CLINIC | Age: 22
End: 2022-09-13
Payer: COMMERCIAL

## 2022-09-13 VITALS — HEIGHT: 66 IN | WEIGHT: 171 LBS | BODY MASS INDEX: 27.48 KG/M2

## 2022-09-13 DIAGNOSIS — F41.9 ANXIETY: ICD-10-CM

## 2022-09-13 DIAGNOSIS — F32.A DEPRESSION, UNSPECIFIED DEPRESSION TYPE: ICD-10-CM

## 2022-09-13 DIAGNOSIS — U07.1 COVID-19: ICD-10-CM

## 2022-09-13 DIAGNOSIS — E03.9 ACQUIRED HYPOTHYROIDISM: Primary | ICD-10-CM

## 2022-09-13 PROCEDURE — 99213 OFFICE O/P EST LOW 20 MIN: CPT | Performed by: FAMILY MEDICINE

## 2022-09-13 RX ORDER — BENZONATATE 100 MG/1
100 CAPSULE ORAL EVERY 8 HOURS PRN
COMMUNITY
Start: 2022-09-12

## 2022-09-13 RX ORDER — HYDROXYZINE 50 MG/1
TABLET, FILM COATED ORAL
Qty: 90 TABLET | Refills: 1 | Status: SHIPPED | OUTPATIENT
Start: 2022-09-13

## 2022-09-13 RX ORDER — PREDNISONE 20 MG/1
20 TABLET ORAL 2 TIMES DAILY
COMMUNITY
Start: 2022-09-12

## 2022-09-13 RX ORDER — BUPROPION HYDROCHLORIDE 300 MG/1
300 TABLET ORAL EVERY MORNING
Qty: 90 TABLET | Refills: 1 | Status: SHIPPED | OUTPATIENT
Start: 2022-09-13

## 2022-09-13 NOTE — PROGRESS NOTES
Virtual Regular Visit    Verification of patient location:    Patient is located in the following state in which I hold an active license [place]      Assessment/Plan:    Problem List Items Addressed This Visit        Endocrine    Acquired hypothyroidism - Primary    Relevant Medications    predniSONE 20 mg tablet  On synthroid 25 mcg  To have repeat tsh done end of September or early October  She is away at school and will call with name of lab where we can fax the order to       Other    Depression  Stable on bupropion  Taking vistaril at hs for sleep  Tolerating well  Anxiety      Other Visit Diagnoses     COVID-19      Diagnosed yesterday at urgent care  She is taking prednisone and tessalon perles  Reason for visit is   Chief Complaint   Patient presents with    Follow-up     Pt has a VIRTUAL visit with Dr Donny Hamilton - c/o hypothyroidism, Depression, Anxiety  Pt states that she is feeling "pretty much the same" since beginning Synthroid usage  Pt is unsure if its because she currently Covid-Positive as of 09/12/2022  Depression - same Anxiety- "okay"  Last labs completed 08/18/2022   Virtual Regular Visit        Encounter provider Yennifer Cruz DO    Provider located at [address] [address]      Recent Visits  No visits were found meeting these conditions  Showing recent visits within past 7 days and meeting all other requirements  Today's Visits  Date Type Provider Dept   09/13/22 Telemedicine Yennifer Cruz DO [facility]   Showing today's visits and meeting all other requirements  Future Appointments  No visits were found meeting these conditions  Showing future appointments within next 150 days and meeting all other requirements       The patient was identified by name and date of birth   Uziel Lamas was informed that this is a telemedicine visit and that the visit is being conducted through [facility] and patient was informed this is a secure, HIPAA-complaint platform  She agrees to proceed     My office door was closed  No one else was in the room  She acknowledged consent and understanding of privacy and security of the video platform  The patient has agreed to participate and understands they can discontinue the visit at any time  Patient is aware this is a billable service  Sosa Cowart is a 22 y o  female who is here for f/u on her hypothyroidism, depression and anxiety  Last seen via telemedicine encounter on 8/18/22  Prior to that visit had been taking cymbalta, abilify, buproprion and vistaril as prescribed by mental health provider  She reported having stopped both the cymbalta and abilify early this summer because "they weren't doing anything"  Her bupropion and vistaril were refilled at her last telemedicine visit  She continues to feel the same with respect to her moods  She has been busy since she got back to school so has not scheduled appt with psychiatry or counselor  ''    In addition was just diagnosed with covid -19  States that she started feeling sick about 1 week ago  Woke up with bodyaches yesterday, prompting her visit to an urgent care in [place]  She had + antigen test in urgent care  Some wheezing/shortness of breath since covid symptoms began  Was given rx for prednisone and tessalon perles at urgent care  Using her inhalers  She is taking her synthroid as prescribed  Is away at school so will have to call with name of lab where we can fax her order for repeat TSH to be done anytime after 9/28/22       PHQ-2/9 Depression Screening    Little interest or pleasure in doing things: 1 - several days  Feeling down, depressed, or hopeless: 1 - several days  Trouble falling or staying asleep, or sleeping too much: 0 - not at all  Feeling tired or having little energy: 1 - several days  Poor appetite or overeatin - not at all  Feeling bad about yourself - or that you are a failure or have let yourself or your family down: 0 - not at all  Trouble concentrating on things, such as reading the newspaper or watching television: 1 - several days  Moving or speaking so slowly that other people could have noticed  Or the opposite - being so fidgety or restless that you have been moving around a lot more than usual: 0 - not at all  Thoughts that you would be better off dead, or of hurting yourself in some way: 0 - not at all  PHQ-9 Score: 4   PHQ-9 Interpretation: No or Minimal depression        KOKI-7 Flowsheet Screening    Flowsheet Row Most Recent Value   Over the last 2 weeks, how often have you been bothered by any of the following problems? Feeling nervous, anxious, or on edge 1   Not being able to stop or control worrying 1   Worrying too much about different things 2   Trouble relaxing 1   Becoming easily annoyed or irritable 0   Feeling afraid as if something awful might happen 1              HPI     Past Medical History:   Diagnosis Date    Anxiety     Asthma     Depression     Hypothyroid     Migraines        History reviewed  No pertinent surgical history  Current Outpatient Medications   Medication Sig Dispense Refill    albuterol (PROVENTIL HFA,VENTOLIN HFA) 90 mcg/act inhaler       buPROPion (WELLBUTRIN XL) 300 mg 24 hr tablet TAKE 1 TABLET (300 MG TOTAL) BY MOUTH EVERY MORNING   90 tablet 1    cetirizine (ZyrTEC) 10 mg tablet Take 1 tablet by mouth daily      EPINEPHrine (EPIPEN) 0 3 mg/0 3 mL SOAJ as needed      hydrOXYzine HCL (ATARAX) 50 mg tablet TAKE 1 TABLET BY MOUTH DAILY AT BEDTIME 90 tablet 1    Isaak 24 FE 1-20 MG-MCG(24) per tablet Take 1 tablet by mouth daily 84 tablet 0    levothyroxine (Synthroid) 25 mcg tablet Take 1 tablet (25 mcg total) by mouth daily 90 tablet 1    Spacer/Aero-Holding Chambers (AeroChamber Plus Robe-Vu) MISC Take 1 Device by mouth 2 (two) times a day  Symbicort 160-4 5 MCG/ACT inhaler       topiramate (TOPAMAX) 50 MG tablet Take 50 mg by mouth 2 (two) times a day      triamcinolone (KENALOG) 0 5 % cream Apply topically 3 (three) times a day 30 g 0    benzonatate (TESSALON PERLES) 100 mg capsule Take 100 mg by mouth every 8 (eight) hours as needed      predniSONE 20 mg tablet Take 20 mg by mouth 2 (two) times a day       No current facility-administered medications for this visit          Allergies   Allergen Reactions    Peanut (Diagnostic) - Food Allergy Anaphylaxis       Review of Systems    Video Exam    Vitals:    09/13/22 1258   Weight: 77 6 kg (171 lb)   Height: 5' 5 5" (1 664 m)       Physical Exam     I spent 10 minutes directly with the patient during this visit

## 2022-09-19 LAB — EXT SARS-COV-2: POSITIVE

## 2022-09-22 ENCOUNTER — TELEPHONE (OUTPATIENT)
Dept: FAMILY MEDICINE CLINIC | Facility: CLINIC | Age: 22
End: 2022-09-22

## 2022-10-03 ENCOUNTER — APPOINTMENT (OUTPATIENT)
Dept: LAB | Facility: CLINIC | Age: 22
End: 2022-10-03
Payer: COMMERCIAL

## 2022-10-03 ENCOUNTER — HOSPITAL ENCOUNTER (EMERGENCY)
Facility: HOSPITAL | Age: 22
Discharge: HOME | End: 2022-10-03
Attending: EMERGENCY MEDICINE
Payer: COMMERCIAL

## 2022-10-03 VITALS
HEIGHT: 66 IN | RESPIRATION RATE: 20 BRPM | SYSTOLIC BLOOD PRESSURE: 161 MMHG | OXYGEN SATURATION: 99 % | BODY MASS INDEX: 27.48 KG/M2 | HEART RATE: 96 BPM | DIASTOLIC BLOOD PRESSURE: 110 MMHG | TEMPERATURE: 98.8 F | WEIGHT: 171 LBS

## 2022-10-03 DIAGNOSIS — E03.9 ACQUIRED HYPOTHYROIDISM: ICD-10-CM

## 2022-10-03 DIAGNOSIS — M79.645 PAIN IN FINGER OF BOTH HANDS: Primary | ICD-10-CM

## 2022-10-03 DIAGNOSIS — M79.644 PAIN IN FINGER OF BOTH HANDS: Primary | ICD-10-CM

## 2022-10-03 LAB
ALBUMIN SERPL BCP-MCNC: 3.9 G/DL (ref 3.5–5)
ALP SERPL-CCNC: 60 U/L (ref 46–116)
ALT SERPL W P-5'-P-CCNC: 52 U/L (ref 12–78)
ANION GAP SERPL CALCULATED.3IONS-SCNC: 9 MMOL/L (ref 4–13)
AST SERPL W P-5'-P-CCNC: 24 U/L (ref 5–45)
BASOPHILS # BLD AUTO: 0.06 THOUSANDS/ΜL (ref 0–0.1)
BASOPHILS NFR BLD AUTO: 1 % (ref 0–1)
BILIRUB SERPL-MCNC: 0.5 MG/DL (ref 0.2–1)
BUN SERPL-MCNC: 8 MG/DL (ref 5–25)
CALCIUM SERPL-MCNC: 9.8 MG/DL (ref 8.3–10.1)
CHLORIDE SERPL-SCNC: 107 MMOL/L (ref 96–108)
CK SERPL-CCNC: 62 U/L (ref 26–192)
CO2 SERPL-SCNC: 23 MMOL/L (ref 21–32)
CREAT SERPL-MCNC: 0.91 MG/DL (ref 0.6–1.3)
EOSINOPHIL # BLD AUTO: 0.17 THOUSAND/ΜL (ref 0–0.61)
EOSINOPHIL NFR BLD AUTO: 2 % (ref 0–6)
ERYTHROCYTE [DISTWIDTH] IN BLOOD BY AUTOMATED COUNT: 12.7 % (ref 11.6–15.1)
GFR SERPL CREATININE-BSD FRML MDRD: 91 ML/MIN/1.73SQ M
GLUCOSE SERPL-MCNC: 93 MG/DL (ref 65–140)
HCT VFR BLD AUTO: 45.9 % (ref 34.8–46.1)
HGB BLD-MCNC: 15.6 G/DL (ref 11.5–15.4)
IMM GRANULOCYTES # BLD AUTO: 0.02 THOUSAND/UL (ref 0–0.2)
IMM GRANULOCYTES NFR BLD AUTO: 0 % (ref 0–2)
LYMPHOCYTES # BLD AUTO: 2.28 THOUSANDS/ΜL (ref 0.6–4.47)
LYMPHOCYTES NFR BLD AUTO: 28 % (ref 14–44)
MCH RBC QN AUTO: 30 PG (ref 26.8–34.3)
MCHC RBC AUTO-ENTMCNC: 34 G/DL (ref 31.4–37.4)
MCV RBC AUTO: 88 FL (ref 82–98)
MONOCYTES # BLD AUTO: 0.61 THOUSAND/ΜL (ref 0.17–1.22)
MONOCYTES NFR BLD AUTO: 8 % (ref 4–12)
NEUTROPHILS # BLD AUTO: 5.01 THOUSANDS/ΜL (ref 1.85–7.62)
NEUTS SEG NFR BLD AUTO: 61 % (ref 43–75)
NRBC BLD AUTO-RTO: 0 /100 WBCS
PLATELET # BLD AUTO: 268 THOUSANDS/UL (ref 149–390)
PMV BLD AUTO: 9.8 FL (ref 8.9–12.7)
POTASSIUM SERPL-SCNC: 3.7 MMOL/L (ref 3.5–5.3)
PROT SERPL-MCNC: 7.7 G/DL (ref 6.4–8.4)
RBC # BLD AUTO: 5.2 MILLION/UL (ref 3.81–5.12)
SODIUM SERPL-SCNC: 139 MMOL/L (ref 135–147)
TSH SERPL DL<=0.05 MIU/L-ACNC: 3.26 UIU/ML (ref 0.45–4.5)
WBC # BLD AUTO: 8.15 THOUSAND/UL (ref 4.31–10.16)

## 2022-10-03 PROCEDURE — 80053 COMPREHEN METABOLIC PANEL: CPT | Performed by: PHYSICIAN ASSISTANT

## 2022-10-03 PROCEDURE — 99282 EMERGENCY DEPT VISIT SF MDM: CPT | Performed by: PHYSICIAN ASSISTANT

## 2022-10-03 PROCEDURE — 36415 COLL VENOUS BLD VENIPUNCTURE: CPT

## 2022-10-03 PROCEDURE — 84443 ASSAY THYROID STIM HORMONE: CPT

## 2022-10-03 PROCEDURE — 85025 COMPLETE CBC W/AUTO DIFF WBC: CPT | Performed by: PHYSICIAN ASSISTANT

## 2022-10-03 PROCEDURE — 99283 EMERGENCY DEPT VISIT LOW MDM: CPT

## 2022-10-03 PROCEDURE — 82550 ASSAY OF CK (CPK): CPT | Performed by: PHYSICIAN ASSISTANT

## 2022-10-03 NOTE — DISCHARGE INSTRUCTIONS
Follow-up with your primary care provider for recheck if no improvement next 3-5 days      Return to the ED for new or worsening symptoms

## 2022-10-03 NOTE — ED PROVIDER NOTES
History  Chief Complaint   Patient presents with    Finger Pain     Pt reports her fingers and toes are painful after finishing a steroid taper 2 days go  Denies injury  Patient is a 28-year-old white female with history of anxiety, asthma who reports 2 day history of pain felt in the tips of fingers of both hands but worse in both thumbs  States recent diagnosis COVID and finished a prednisone taper 2 days ago  She denies extremity numbness or tingling  She states his symptoms feel like an ingrown toenail or splinter " She became concerned when she read on Google that Guillain-[place] was a possibility  She denies any ascending paralysis  She denies any other neurologic symptoms  She has no fever, chills, chest pain, shortness of breath, abdominal, nausea or vomiting  Prior to Admission Medications   Prescriptions Last Dose Informant Patient Reported? Taking? EPINEPHrine (EPIPEN) 0 3 mg/0 3 mL SOAJ Unknown at Unknown time  Yes No   Sig: as needed   Isaak 24 FE 1-20 MG-MCG(24) per tablet 10/3/2022 at Unknown time  No Yes   Sig: Take 1 tablet by mouth daily   Spacer/Aero-Holding Chambers (AeroChamber Plus Robe-Vu) MISC Past Week at Unknown time  Yes Yes   Sig: Take 1 Device by mouth 2 (two) times a day   Symbicort 160-4 5 MCG/ACT inhaler 10/3/2022 at Unknown time  Yes Yes   albuterol (PROVENTIL HFA,VENTOLIN HFA) 90 mcg/act inhaler Past Week at Unknown time  Yes Yes   benzonatate (TESSALON PERLES) 100 mg capsule Past Week at Unknown time  Yes Yes   Sig: Take 100 mg by mouth every 8 (eight) hours as needed   buPROPion (WELLBUTRIN XL) 300 mg 24 hr tablet 10/3/2022 at Unknown time  No Yes   Sig: TAKE 1 TABLET (300 MG TOTAL) BY MOUTH EVERY MORNING     cetirizine (ZyrTEC) 10 mg tablet 10/2/2022 at Unknown time  Yes Yes   Sig: Take 1 tablet by mouth daily   hydrOXYzine HCL (ATARAX) 50 mg tablet Past Week at Unknown time  No Yes   Sig: TAKE 1 TABLET BY MOUTH DAILY AT BEDTIME   levothyroxine (Synthroid) 25 mcg tablet 10/3/2022 at Unknown time  No Yes   Sig: Take 1 tablet (25 mcg total) by mouth daily   predniSONE 20 mg tablet Past Week at Unknown time  Yes Yes   Sig: Take 20 mg by mouth 2 (two) times a day   topiramate (TOPAMAX) 50 MG tablet 10/3/2022 at Unknown time  Yes Yes   Sig: Take 50 mg by mouth 2 (two) times a day   triamcinolone (KENALOG) 0 5 % cream Past Month at Unknown time  No Yes   Sig: Apply topically 3 (three) times a day      Facility-Administered Medications: None       Past Medical History:   Diagnosis Date    Anxiety     Asthma     Depression     Hypothyroid     Migraines     Pneumonia due to COVID-19 virus 09/19/2022    seen in [place] [facility]  CXR reviewed and showed no focal consolidation  was put on paxlovid  see scanned progress note/xray report       History reviewed  No pertinent surgical history  Family History   Problem Relation Age of Onset    Hypertension Mother     Hypertension Maternal Grandmother     Heart disease Maternal Grandfather     Hypertension Maternal Grandfather     Diabetes Paternal Grandfather      I have reviewed and agree with the history as documented  E-Cigarette/Vaping    E-Cigarette Use Never User      E-Cigarette/Vaping Substances     Social History     Tobacco Use    Smoking status: Never Smoker    Smokeless tobacco: Never Used   Vaping Use    Vaping Use: Never used   Substance Use Topics    Alcohol use: Never     Comment: Does not drink alcohol - As per [employer]     Drug use: Never     Comment: No - As per [employer]        Review of Systems   Constitutional: Negative for chills and fever  HENT: Negative for ear pain and sore throat  Respiratory: Negative for cough and shortness of breath  Cardiovascular: Negative for chest pain and palpitations  Gastrointestinal: Negative for abdominal pain and vomiting  Genitourinary: Negative for dysuria and hematuria     Musculoskeletal: Negative for arthralgias, back pain, joint swelling and neck pain  Skin: Negative for color change and rash  Neurological: Negative for syncope, numbness and headaches  All other systems reviewed and are negative  Physical Exam  Physical Exam  Vitals and nursing note reviewed  Constitutional:       General: She is not in acute distress  Appearance: Normal appearance  She is not ill-appearing, toxic-appearing or diaphoretic  HENT:      Head: Normocephalic and atraumatic  Right Ear: Tympanic membrane, ear canal and external ear normal       Left Ear: Tympanic membrane, ear canal and external ear normal       Nose: Nose normal       Mouth/Throat:      Mouth: Mucous membranes are moist       Pharynx: Oropharynx is clear  Eyes:      Extraocular Movements: Extraocular movements intact  Conjunctiva/sclera: Conjunctivae normal       Pupils: Pupils are equal, round, and reactive to light  Cardiovascular:      Rate and Rhythm: Normal rate and regular rhythm  Pulses: Normal pulses  Heart sounds: Normal heart sounds  Pulmonary:      Effort: Pulmonary effort is normal       Breath sounds: Normal breath sounds  Abdominal:      General: Abdomen is flat  Bowel sounds are normal       Palpations: Abdomen is soft  Musculoskeletal:         General: No swelling, tenderness, deformity or signs of injury  Normal range of motion  Cervical back: Normal range of motion and neck supple  Skin:     General: Skin is warm and dry  Capillary Refill: Capillary refill takes less than 2 seconds  Neurological:      General: No focal deficit present  Mental Status: She is alert and oriented to person, place, and time  Mental status is at baseline  Cranial Nerves: No cranial nerve deficit  Sensory: No sensory deficit  Motor: No weakness        Coordination: Coordination normal          Vital Signs  ED Triage Vitals [10/03/22 1219]   Temperature Pulse Respirations Blood Pressure SpO2   98 8 °F (37 1 °C) 96 20 (!) 161/110 99 %      Temp Source Heart Rate Source Patient Position - Orthostatic VS BP Location FiO2 (%)   Temporal Monitor Sitting Left arm --      Pain Score       6           Vitals:    10/03/22 1219   BP: (!) 161/110   Pulse: 96   Patient Position - Orthostatic VS: Sitting         Visual Acuity      ED Medications  Medications - No data to display    Diagnostic Studies  Results Reviewed     Procedure Component Value Units Date/Time    Comprehensive metabolic panel [[id number]] Collected: 10/03/22 1350    Lab Status: Final result Specimen: Blood from Arm, Left Updated: 10/03/22 1413     Sodium 139 mmol/L      Potassium 3 7 mmol/L      Chloride 107 mmol/L      CO2 23 mmol/L      ANION GAP 9 mmol/L      BUN 8 mg/dL      Creatinine 0 91 mg/dL      Glucose 93 mg/dL      Calcium 9 8 mg/dL      AST 24 U/L      ALT 52 U/L      Alkaline Phosphatase 60 U/L      Total Protein 7 7 g/dL      Albumin 3 9 g/dL      Total Bilirubin 0 50 mg/dL      eGFR 91 ml/min/1 73sq m     Narrative:      [place] guidelines for Chronic Kidney Disease (CKD):     Stage 1 with normal or high GFR (GFR > 90 mL/min/1 73 square meters)    Stage 2 Mild CKD (GFR = 60-89 mL/min/1 73 square meters)    Stage 3A Moderate CKD (GFR = 45-59 mL/min/1 73 square meters)    Stage 3B Moderate CKD (GFR = 30-44 mL/min/1 73 square meters)    Stage 4 Severe CKD (GFR = 15-29 mL/min/1 73 square meters)    Stage 5 End Stage CKD (GFR <15 mL/min/1 73 square meters)  Note: GFR calculation is accurate only with a steady state creatinine    CK (with reflex to MB) [[id number]]  (Normal) Collected: 10/03/22 1350    Lab Status: Final result Specimen: Blood from Arm, Left Updated: 10/03/22 1413     Total CK 62 U/L     CBC and differential [[id number]]  (Abnormal) Collected: 10/03/22 1350    Lab Status: Final result Specimen: Blood from Arm, Left Updated: 10/03/22 1359     WBC 8 15 Thousand/uL      RBC 5 20 Million/uL      Hemoglobin 15 6 g/dL Hematocrit 45 9 %      MCV 88 fL      MCH 30 0 pg      MCHC 34 0 g/dL      RDW 12 7 %      MPV 9 8 fL      Platelets 366 Thousands/uL      nRBC 0 /100 WBCs      Neutrophils Relative 61 %      Immat GRANS % 0 %      Lymphocytes Relative 28 %      Monocytes Relative 8 %      Eosinophils Relative 2 %      Basophils Relative 1 %      Neutrophils Absolute 5 01 Thousands/µL      Immature Grans Absolute 0 02 Thousand/uL      Lymphocytes Absolute 2 28 Thousands/µL      Monocytes Absolute 0 61 Thousand/µL      Eosinophils Absolute 0 17 Thousand/µL      Basophils Absolute 0 06 Thousands/µL                  No orders to display              Procedures  Procedures         ED Course         CRAFFT    Flowsheet Row Most Recent Value   SBIRT (13-21 yo)    In order to provide better care to our patients, we are screening all of our patients for alcohol and drug use  Would it be okay to ask you these screening questions? Yes Filed at: 10/03/2022 1228   BRENDEN Initial Screen: During the past 12 months, did you:    1  Drink any alcohol (more than a few sips)? No Filed at: 10/03/2022 1228   2  Smoke any marijuana or hashish No Filed at: 10/03/2022 1228   3  Use anything else to get high? ("anything else" includes illegal drugs, over the counter and prescription drugs, and things that you sniff or 'ann')? No Filed at: 10/03/2022 1228                                          MDM  Number of Diagnoses or Management Options  Pain in finger of both hands: new and requires workup  Diagnosis management comments: Unclear etiology of patient's symptoms in fingers of both hands but worse in the thumbs  Normal blood work at this time  Patient will follow-up with her primary care provider if no improvement next couple days  She has normal strength, sensation and pulses in all extremities  Cap refill is less than 2 seconds    Return precautions given including worsening symptoms       Amount and/or Complexity of Data Reviewed  Clinical lab tests: reviewed  Tests in the medicine section of CPT®: reviewed  Decide to obtain previous medical records or to obtain history from someone other than the patient: yes  Review and summarize past medical records: yes  Independent visualization of images, tracings, or specimens: yes    Risk of Complications, Morbidity, and/or Mortality  Presenting problems: low  Diagnostic procedures: low  Management options: low    Patient Progress  Patient progress: stable      Disposition  Final diagnoses:   Pain in finger of both hands     Time reflects when diagnosis was documented in both MDM as applicable and the Disposition within this note     Time User Action Codes Description Comment    10/3/2022  2:38 PM Маиря Willis [C02 [id number],  [id number]] Pain in finger of both hands       ED Disposition     ED Disposition   Discharge    Condition   Stable    Date/Time   Mon Oct 3, 2022  2:38 PM    [address] discharge to home/self care                 Follow-up Information     Follow up With Specialties Details Why [address] [place] [address], [address] [place] [address]  [phone]            Discharge Medication List as of 10/3/2022  2:39 PM      CONTINUE these medications which have NOT CHANGED    Details   albuterol (PROVENTIL HFA,VENTOLIN HFA) 90 mcg/act inhaler Starting Mon 6/27/2022, Historical Med      benzonatate (TESSALON PERLES) 100 mg capsule Take 100 mg by mouth every 8 (eight) hours as needed, Starting Mon 9/12/2022, Historical Med      buPROPion (WELLBUTRIN XL) 300 mg 24 hr tablet TAKE 1 TABLET (300 MG TOTAL) BY MOUTH EVERY MORNING , Starting Tue 9/13/2022, Normal      cetirizine (ZyrTEC) 10 mg tablet Take 1 tablet by mouth daily, Historical Med      hydrOXYzine HCL (ATARAX) 50 mg tablet TAKE 1 TABLET BY MOUTH DAILY AT BEDTIME, Normal      Isaak 24 FE 1-20 MG-MCG(24) per tablet Take 1 tablet by mouth daily, Starting Tue 4/19/2022, Normal levothyroxine (Synthroid) 25 mcg tablet Take 1 tablet (25 mcg total) by mouth daily, Starting Thu 8/18/2022, Normal      predniSONE 20 mg tablet Take 20 mg by mouth 2 (two) times a day, Starting Mon 9/12/2022, Historical Med      Spacer/Aero-Holding Chambers (AeroChamber Plus Robe-Vu) MISC Take 1 Device by mouth 2 (two) times a day, Starting Mon 6/27/2022, Historical Med      Symbicort 160-4 5 MCG/ACT inhaler Starting Mon 6/27/2022, Historical Med      topiramate (TOPAMAX) 50 MG tablet Take 50 mg by mouth 2 (two) times a day, Starting Tue 5/31/2022, Historical Med      triamcinolone (KENALOG) 0 5 % cream Apply topically 3 (three) times a day, Starting Thu 8/26/2021, Normal      EPINEPHrine (EPIPEN) 0 3 mg/0 3 mL SOAJ as needed, Starting Mon 8/23/2021, Historical Med             No discharge procedures on file      PDMP Review     None          ED Provider  Electronically Signed by           Moy White PA-C  10/03/22 [id number]

## 2022-10-04 ENCOUNTER — TELEPHONE (OUTPATIENT)
Dept: FAMILY MEDICINE CLINIC | Facility: CLINIC | Age: 22
End: 2022-10-04

## 2022-10-04 NOTE — TELEPHONE ENCOUNTER
----- Message from Javier Girard DO sent at 10/4/2022  8:18 AM EDT -----  Please let patient know that thyroid labs ordered in ED were normal

## 2022-10-04 NOTE — TELEPHONE ENCOUNTER
I left a VM advising pt to call the office for her lab results  [employer] message also sent

## 2023-02-01 DIAGNOSIS — E03.9 ACQUIRED HYPOTHYROIDISM: ICD-10-CM

## 2023-02-01 RX ORDER — LEVOTHYROXINE SODIUM 0.03 MG/1
25 TABLET ORAL DAILY
Qty: 90 TABLET | Refills: 0 | Status: SHIPPED | OUTPATIENT
Start: 2023-02-01

## 2023-03-20 DIAGNOSIS — F32.A DEPRESSION, UNSPECIFIED DEPRESSION TYPE: ICD-10-CM

## 2023-03-20 DIAGNOSIS — F41.9 ANXIETY: ICD-10-CM

## 2023-03-20 RX ORDER — HYDROXYZINE 50 MG/1
50 TABLET, FILM COATED ORAL
Qty: 90 TABLET | Refills: 1 | Status: SHIPPED | OUTPATIENT
Start: 2023-03-20

## 2023-03-21 RX ORDER — BUPROPION HYDROCHLORIDE 300 MG/1
300 TABLET ORAL EVERY MORNING
Qty: 90 TABLET | Refills: 0 | Status: SHIPPED | OUTPATIENT
Start: 2023-03-21

## 2023-03-21 NOTE — TELEPHONE ENCOUNTER
1st telephonic attempt -   3/21/2023 @ 7:56AM    I left a VM advising pt to call the office to schedule follow-up appointment; overdue  [employer] message also sent

## 2023-03-27 NOTE — PROGRESS NOTES
Name: Patience Zuniga      : 2000      MRN: [id number]  Encounter Provider: Bess Moore DO  Encounter Date: 3/28/2023   Encounter department: [address] [place] [address]     1  Acquired hypothyroidism  -     TSH, 3rd generation with Free T4 reflex; Future  -     Thyroid Antibodies Panel; Future  -     Anti-thyroglobulin antibody; Future  Will recheck her TSH since she is experiencing some sx that are suggestive of hypothyroid state (fatigue and weight gain)  She had questioned reason for her hypothyroidism  Explained that most common cause is hashimoto's/autoimmune thyroiditis  Will get thyroid antibodies  2  Allergic rhinitis, unspecified seasonality, unspecified trigger  Stable on current meds  3  Moderate persistent asthma without complication  Stable with no recent exacerbations  On symbicort bid  4  Migraine with aura and without status migrainosus, not intractable  Improved  She has been seeing someone virtually for her migraines because she could not get home from school  Still taking topamax for prophylaxis and telehealth provider had given her rx for nurtec  She has not tried it yet  5  Depression, unspecified depression type  Stable  She denies feeling depressed  Taking bupropion xl 300 mg daily  6  Anxiety  -     busPIRone (BUSPAR) 5 mg tablet; Take 1 tablet (5 mg total) by mouth 2 (two) times a day  Anxiety worsened in severity over last school year  She is no longer seeing psychiatrist in [place] [address]  She declines referral to someone locally as she will not be able to get back home from school for visits  She has tried and failed multiple different meds in past  As noted below  I strongly encouraged her to look into finding counselor while away at school  She is agreeable  Will add buspar 5 mg bid  Recheck when she returns home for summer break in May  Call sooner with any issues     7  Elevated blood-pressure reading without "diagnosis of hypertension  -     Comprehensive metabolic panel; Future  -     Aldosterone/Renin Ratio; Future  Recheck bp improved after seated quietly  Only med that could be contributing is her oral contraceptives  She was advised to follow low sodium diet and check bp in ambulatory setting (mother has a cuff she can borrow)  Will get renin aldosterone ratio and cmp   8  Cushingoid facies  -     Cortisol Level, AM Specimen; Future  Check cortisol   9  Irritable bowel syndrome with both constipation and diarrhea  -     Celiac Disease Antibody Profile; Future  Recommend high fiber diet  Check celiac panel  10  Other fatigue  -     CBC and differential; Future  ? 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  Patient was started on synthroid 25 mcg once daily in September of last year after her TSH came back elevated at 7 350  her repeat TSH on 10/3/22 was normal at 3 260  Lab Results   Component Value Date    [id number] 3 260 10/03/2022    TSH 7 350 (H) 08/17/2022       Patient Active Problem List   Diagnosis   • Migraine with aura and without status migrainosus, not intractable---she is no longer seeing Dr Isabel West  States that she got rx for nurtec from a \"telehealth website\"  Is getting migraines once every 2 months  She has not had opportunity to try the nurtec  continues to take the topamax for prevention of her migraines  • Depression--at time of patient's last face to face visit in June of 2022, she noted that she was seeing  psychiatrist in [address] [place] [address]   Was taking wellbutrin   Mg " daily, abilify 5 mg daily, and cymbalta 60 mg daily  Mother had messaged our office in august requesting that we take over her medication prescribing until she could find a new psychiatrist  Unfortunately has not been able to find one  She denies feeling depressed  PHQ-2/9 Depression Screening    Little interest or pleasure in doing things: 1 - several days  Feeling down, depressed, or hopeless: 1 - several days  Trouble falling or staying asleep, or sleeping too much: 2 - more than half the days  Feeling tired or having little energy: 3 - nearly every day  Poor appetite or overeatin - not at all  Feeling bad about yourself - or that you are a failure or have let yourself or your family down: 0 - not at all  Trouble concentrating on things, such as reading the newspaper or watching television: 1 - several days  Moving or speaking so slowly that other people could have noticed  Or the opposite - being so fidgety or restless that you have been moving around a lot more than usual: 1 - several days  Thoughts that you would be better off dead, or of hurting yourself in some way: 0 - not at all  PHQ-9 Score: 9   PHQ-9 Interpretation: Mild depression           • Anxiety--feels that her anxiety has worsened over this past school year  Today, she reports that she is feeling nervous, having difficulty focusing and not sleeping well  She is not seeing counselor  Prior to her being on wellbutrin, she was on prozac  prozac made her more anxious  She also tried lexapro and states that it didn't really do anything  States that cymbalta was ineffective and made her tired  When she was on cymbalta, was also on abilify  • Allergic rhinitis   • Moderate persistent asthma without complication--has been well controlled  Last flare was when she had covid in September of last year  • Acquired hypothyroidism   • Elevated liver enzymes     Patient has some other concerns today as well  1  Is feeling fatigued   Ongoing for "years  She sees no difference since starting the synthroid  She does not sleep well at night  Difficulty with both falling asleep and staying asleep  Tired upon awakening  She does admit  Lightheadedness on occasion--if she stands up from lying down  No shortness of breath  2  has concerns regarding her weight  She has gained 26 lbs in last 4-5 months  Face feels puffy and has noticed some stretch marks on her body (upper arms and abdomen and inner thighs)  She denies any major changes to diet or activity level  3  Having what she describes as \"digestion issues\"  Intermittent episodes of diarrhea, alternating with constipation along with bloating, gas and cramping  Does not seem to matter what she eats  This started last year  Review of Systems    Past Medical History:   Diagnosis Date   • Anxiety    • Asthma    • Depression    • Hypothyroid    • Migraines    • Pneumonia due to COVID-19 virus 09/19/2022    seen in [facility]  CXR reviewed and showed no focal consolidation  was put on paxlovid  see scanned progress note/xray report     History reviewed  No pertinent surgical history    Family History   Problem Relation Age of Onset   • Hypertension Mother    • Hypertension Maternal Grandmother    • Heart disease Maternal Grandfather    • Hypertension Maternal Grandfather    • Diabetes Paternal Grandfather      Social History     Socioeconomic History   • Marital status: Single     Spouse name: None   • Number of children: None   • Years of education: None   • Highest education level: None   Occupational History   • Occupation: Student   Tobacco Use   • Smoking status: Never   • Smokeless tobacco: Never   Vaping Use   • Vaping Use: Never used   Substance and Sexual Activity   • Alcohol use: Never     Comment: Does not drink alcohol - As per [employer]    • Drug use: Never     Comment: No - As per [employer]    • Sexual activity: Never   Other Topics Concern   • None   Social History " Narrative    Sexual abuse: No    Exercise: Occasionally    Domestic violence: No     History of drug/alcohol abuse: Denies    Lives with parents    Is a college student at [employer]     Social Determinants of Health     Financial Resource Strain: Not on file   Food Insecurity: Not on file   Transportation Needs: Not on file   Physical Activity: Not on file   Stress: Not on file   Social Connections: Not on file   Intimate Partner Violence: Not on file   Housing Stability: Not on file     Current Outpatient Medications on File Prior to Visit   Medication Sig   • albuterol (PROVENTIL HFA,VENTOLIN HFA) 90 mcg/act inhaler    • buPROPion (WELLBUTRIN XL) 300 mg 24 hr tablet Take 1 tablet (300 mg total) by mouth every morning   • cetirizine (ZyrTEC) 10 mg tablet Take 1 tablet by mouth daily   • EPINEPHrine (EPIPEN) 0 3 mg/0 3 mL SOAJ as needed   • fluticasone (FLONASE) 50 mcg/act nasal spray 1 spray into each nostril daily as needed   • hydrOXYzine HCL (ATARAX) 50 mg tablet Take 1 tablet (50 mg total) by mouth daily at bedtime   • Isaak 24 FE 1-20 MG-MCG(24) per tablet Take 1 tablet by mouth daily   • levothyroxine (Synthroid) 25 mcg tablet Take 1 tablet (25 mcg total) by mouth daily   • Nurtec 75 MG TBDP Take 75 mg by mouth if needed   • Spacer/Aero-Holding Chambers (AeroChamber Plus Robe-Vu) MISC Take 1 Device by mouth 2 (two) times a day   • spironolactone (ALDACTONE) 50 mg tablet Take 1 tablet by mouth daily   • Symbicort 160-4 5 MCG/ACT inhaler    • topiramate (TOPAMAX) 50 MG tablet Take 50 mg by mouth 2 (two) times a day   • tretinoin (REFISSA) 0 05 % cream Apply 1 application   topically if needed   • triamcinolone (KENALOG) 0 5 % cream Apply topically 3 (three) times a day   • [DISCONTINUED] benzonatate (TESSALON PERLES) 100 mg capsule Take 100 mg by mouth every 8 (eight) hours as needed (Patient not taking: Reported on 3/28/2023)   • [DISCONTINUED] predniSONE 20 mg tablet Take 20 mg by mouth 2 (two) "times a day (Patient not taking: Reported on 3/28/2023)     Allergies   Allergen Reactions   • Peanut (Diagnostic) - Food Allergy Anaphylaxis     Immunization History   Administered Date(s) Administered   • HPV 11/24/2015, 01/27/2016, 06/01/2016   • Pneumococcal 04/05/2002   • Tdap 10/09/2012       Objective     /86   Pulse 104   Temp 99 1 °F (37 3 °C) (Tympanic)   Ht 5' 6\" (1 676 m)   Wt 89 4 kg (197 lb)   LMP  (LMP Unknown)   SpO2 99%   BMI 31 80 kg/m²     Physical Exam  Vitals and nursing note reviewed  Constitutional:       Appearance: Normal appearance  HENT:      Head: Normocephalic and atraumatic  Comments: Chung facies     Right Ear: Tympanic membrane normal       Left Ear: Tympanic membrane normal       Mouth/Throat:      Mouth: Mucous membranes are moist    Eyes:      Extraocular Movements: Extraocular movements intact  Conjunctiva/sclera: Conjunctivae normal       Pupils: Pupils are equal, round, and reactive to light  Neck:      Vascular: No carotid bruit  Cardiovascular:      Rate and Rhythm: Normal rate and regular rhythm  Heart sounds: No murmur heard  Pulmonary:      Effort: Pulmonary effort is normal       Breath sounds: Normal breath sounds  Abdominal:      General: Abdomen is flat  Bowel sounds are normal  There is no distension  Palpations: Abdomen is soft  There is no mass  Tenderness: There is no abdominal tenderness  Musculoskeletal:         General: Normal range of motion  Cervical back: Normal range of motion and neck supple  Right lower leg: No edema  Left lower leg: No edema  Lymphadenopathy:      Cervical: No cervical adenopathy  Skin:     General: Skin is warm and dry  Comments: Striations upper arms, abdomen and upper thighs   Neurological:      General: No focal deficit present  Mental Status: She is alert and oriented to person, place, and time     Psychiatric:         Mood and Affect: Mood normal  " Alejandro Ferreira, DO

## 2023-03-28 ENCOUNTER — OFFICE VISIT (OUTPATIENT)
Dept: FAMILY MEDICINE CLINIC | Facility: CLINIC | Age: 23
End: 2023-03-28

## 2023-03-28 VITALS
BODY MASS INDEX: 31.66 KG/M2 | TEMPERATURE: 99.1 F | DIASTOLIC BLOOD PRESSURE: 86 MMHG | WEIGHT: 197 LBS | HEIGHT: 66 IN | SYSTOLIC BLOOD PRESSURE: 130 MMHG | HEART RATE: 104 BPM | OXYGEN SATURATION: 99 %

## 2023-03-28 DIAGNOSIS — F32.A DEPRESSION, UNSPECIFIED DEPRESSION TYPE: ICD-10-CM

## 2023-03-28 DIAGNOSIS — J30.9 ALLERGIC RHINITIS, UNSPECIFIED SEASONALITY, UNSPECIFIED TRIGGER: ICD-10-CM

## 2023-03-28 DIAGNOSIS — E03.9 ACQUIRED HYPOTHYROIDISM: Primary | ICD-10-CM

## 2023-03-28 DIAGNOSIS — G43.109 MIGRAINE WITH AURA AND WITHOUT STATUS MIGRAINOSUS, NOT INTRACTABLE: ICD-10-CM

## 2023-03-28 DIAGNOSIS — K58.2 IRRITABLE BOWEL SYNDROME WITH BOTH CONSTIPATION AND DIARRHEA: ICD-10-CM

## 2023-03-28 DIAGNOSIS — J45.40 MODERATE PERSISTENT ASTHMA WITHOUT COMPLICATION: ICD-10-CM

## 2023-03-28 DIAGNOSIS — R53.83 OTHER FATIGUE: ICD-10-CM

## 2023-03-28 DIAGNOSIS — R03.0 ELEVATED BLOOD-PRESSURE READING WITHOUT DIAGNOSIS OF HYPERTENSION: ICD-10-CM

## 2023-03-28 DIAGNOSIS — R63.5 WEIGHT GAIN: ICD-10-CM

## 2023-03-28 DIAGNOSIS — R68.89 CUSHINGOID FACIES: ICD-10-CM

## 2023-03-28 DIAGNOSIS — F41.9 ANXIETY: ICD-10-CM

## 2023-03-28 RX ORDER — TRETINOIN 0.5 MG/G
1 CREAM TOPICAL AS NEEDED
COMMUNITY
Start: 2023-02-09

## 2023-03-28 RX ORDER — FLUTICASONE PROPIONATE 50 MCG
1 SPRAY, SUSPENSION (ML) NASAL DAILY PRN
COMMUNITY

## 2023-03-28 RX ORDER — RIMEGEPANT SULFATE 75 MG/75MG
75 TABLET, ORALLY DISINTEGRATING ORAL AS NEEDED
COMMUNITY
Start: 2023-03-18

## 2023-03-28 RX ORDER — BUSPIRONE HYDROCHLORIDE 5 MG/1
5 TABLET ORAL 2 TIMES DAILY
Qty: 60 TABLET | Refills: 5 | Status: SHIPPED | OUTPATIENT
Start: 2023-03-28

## 2023-03-28 RX ORDER — SPIRONOLACTONE 50 MG/1
1 TABLET, FILM COATED ORAL DAILY
COMMUNITY
Start: 2023-02-09

## 2023-03-28 NOTE — PATIENT INSTRUCTIONS
Continue your wellbutrin and continue to look for counselor while away at school  Start buspar 5 mg twice daily  Get labs done as ordered and I will call you with results     Try to increase fiber in diet

## 2023-04-01 ENCOUNTER — APPOINTMENT (OUTPATIENT)
Dept: LAB | Facility: CLINIC | Age: 23
End: 2023-04-01

## 2023-04-01 DIAGNOSIS — R03.0 ELEVATED BLOOD-PRESSURE READING WITHOUT DIAGNOSIS OF HYPERTENSION: ICD-10-CM

## 2023-04-01 DIAGNOSIS — R53.83 OTHER FATIGUE: ICD-10-CM

## 2023-04-01 DIAGNOSIS — E03.9 ACQUIRED HYPOTHYROIDISM: ICD-10-CM

## 2023-04-01 DIAGNOSIS — R63.5 WEIGHT GAIN: ICD-10-CM

## 2023-04-01 DIAGNOSIS — R68.89 CUSHINGOID FACIES: ICD-10-CM

## 2023-04-01 DIAGNOSIS — K58.2 IRRITABLE BOWEL SYNDROME WITH BOTH CONSTIPATION AND DIARRHEA: ICD-10-CM

## 2023-04-01 LAB
ALBUMIN SERPL BCP-MCNC: 3.8 G/DL (ref 3.5–5)
ALP SERPL-CCNC: 51 U/L (ref 46–116)
ALT SERPL W P-5'-P-CCNC: 35 U/L (ref 12–78)
ANION GAP SERPL CALCULATED.3IONS-SCNC: 4 MMOL/L (ref 4–13)
AST SERPL W P-5'-P-CCNC: 18 U/L (ref 5–45)
BASOPHILS # BLD AUTO: 0.09 THOUSANDS/ÂΜL (ref 0–0.1)
BASOPHILS NFR BLD AUTO: 1 % (ref 0–1)
BILIRUB SERPL-MCNC: 0.35 MG/DL (ref 0.2–1)
BUN SERPL-MCNC: 11 MG/DL (ref 5–25)
CALCIUM SERPL-MCNC: 9.1 MG/DL (ref 8.3–10.1)
CHLORIDE SERPL-SCNC: 111 MMOL/L (ref 96–108)
CO2 SERPL-SCNC: 21 MMOL/L (ref 21–32)
CORTIS AM PEAK SERPL-MCNC: 17.3 UG/DL (ref 4.2–22.4)
CREAT SERPL-MCNC: 0.88 MG/DL (ref 0.6–1.3)
EOSINOPHIL # BLD AUTO: 0.19 THOUSAND/ÂΜL (ref 0–0.61)
EOSINOPHIL NFR BLD AUTO: 2 % (ref 0–6)
ERYTHROCYTE [DISTWIDTH] IN BLOOD BY AUTOMATED COUNT: 12.1 % (ref 11.6–15.1)
GFR SERPL CREATININE-BSD FRML MDRD: 94 ML/MIN/1.73SQ M
GLUCOSE SERPL-MCNC: 90 MG/DL (ref 65–140)
HCT VFR BLD AUTO: 41.3 % (ref 34.8–46.1)
HGB BLD-MCNC: 14.2 G/DL (ref 11.5–15.4)
IMM GRANULOCYTES # BLD AUTO: 0.03 THOUSAND/UL (ref 0–0.2)
IMM GRANULOCYTES NFR BLD AUTO: 0 % (ref 0–2)
LYMPHOCYTES # BLD AUTO: 3.35 THOUSANDS/ÂΜL (ref 0.6–4.47)
LYMPHOCYTES NFR BLD AUTO: 37 % (ref 14–44)
MCH RBC QN AUTO: 30.4 PG (ref 26.8–34.3)
MCHC RBC AUTO-ENTMCNC: 34.4 G/DL (ref 31.4–37.4)
MCV RBC AUTO: 88 FL (ref 82–98)
MONOCYTES # BLD AUTO: 0.61 THOUSAND/ÂΜL (ref 0.17–1.22)
MONOCYTES NFR BLD AUTO: 7 % (ref 4–12)
NEUTROPHILS # BLD AUTO: 4.87 THOUSANDS/ÂΜL (ref 1.85–7.62)
NEUTS SEG NFR BLD AUTO: 53 % (ref 43–75)
NRBC BLD AUTO-RTO: 0 /100 WBCS
PLATELET # BLD AUTO: 298 THOUSANDS/UL (ref 149–390)
PMV BLD AUTO: 10.1 FL (ref 8.9–12.7)
POTASSIUM SERPL-SCNC: 3.5 MMOL/L (ref 3.5–5.3)
PROT SERPL-MCNC: 7.2 G/DL (ref 6.4–8.4)
RBC # BLD AUTO: 4.67 MILLION/UL (ref 3.81–5.12)
SODIUM SERPL-SCNC: 136 MMOL/L (ref 135–147)
T4 FREE SERPL-MCNC: 1.12 NG/DL (ref 0.76–1.46)
TSH SERPL DL<=0.05 MIU/L-ACNC: 5.27 UIU/ML (ref 0.45–4.5)
WBC # BLD AUTO: 9.14 THOUSAND/UL (ref 4.31–10.16)

## 2023-04-02 LAB — THYROPEROXIDASE AB SERPL-ACNC: 10 IU/ML (ref 0–34)

## 2023-04-03 ENCOUNTER — TELEPHONE (OUTPATIENT)
Dept: FAMILY MEDICINE CLINIC | Facility: CLINIC | Age: 23
End: 2023-04-03

## 2023-04-03 RX ORDER — LEVOTHYROXINE SODIUM 0.05 MG/1
50 TABLET ORAL DAILY
Qty: 30 TABLET | Refills: 1 | Status: SHIPPED | OUTPATIENT
Start: 2023-04-03

## 2023-04-03 NOTE — TELEPHONE ENCOUNTER
----- Message from Kasandra Lam DO sent at 4/3/2023 12:28 PM EDT -----  Can let patient know that I reviewed her lab tests  Her thyroid is underactive  Her thyroid antibodies were negative  She should increase her synthoid from 25 to 50 mcg once daily  Recheck tsh in 8 weeks  Her other labs are okay thus far (cbc normal, blood sugar normal, cortisol level normal)

## 2023-04-04 ENCOUNTER — TELEPHONE (OUTPATIENT)
Dept: FAMILY MEDICINE CLINIC | Facility: CLINIC | Age: 23
End: 2023-04-04

## 2023-04-04 LAB
ACTH PLAS-MCNC: 21.1 PG/ML (ref 7.2–63.3)
ENDOMYSIUM IGA SER QL: NEGATIVE
GLIADIN PEPTIDE IGA SER-ACNC: 4 UNITS (ref 0–19)
GLIADIN PEPTIDE IGG SER-ACNC: 6 UNITS (ref 0–19)
IGA SERPL-MCNC: 202 MG/DL (ref 87–352)
THYROGLOB AB SERPL-ACNC: <1 IU/ML (ref 0–0.9)
TTG IGA SER-ACNC: <2 U/ML (ref 0–3)
TTG IGG SER-ACNC: <2 U/ML (ref 0–5)

## 2023-04-04 NOTE — TELEPHONE ENCOUNTER
----- Message from Sade Carter DO sent at 4/4/2023  8:10 AM EDT -----  Can let patient know that her celiac screen was negative

## 2023-04-21 DIAGNOSIS — F41.9 ANXIETY: ICD-10-CM

## 2023-04-24 RX ORDER — BUSPIRONE HYDROCHLORIDE 5 MG/1
5 TABLET ORAL 2 TIMES DAILY
Qty: 180 TABLET | Refills: 1 | Status: SHIPPED | OUTPATIENT
Start: 2023-04-24

## 2023-05-04 DIAGNOSIS — E03.9 ACQUIRED HYPOTHYROIDISM: ICD-10-CM

## 2023-05-04 RX ORDER — LEVOTHYROXINE SODIUM 0.05 MG/1
TABLET ORAL
Qty: 90 TABLET | Refills: 1 | Status: SHIPPED | OUTPATIENT
Start: 2023-05-04

## 2023-05-30 ENCOUNTER — OFFICE VISIT (OUTPATIENT)
Dept: FAMILY MEDICINE CLINIC | Facility: CLINIC | Age: 23
End: 2023-05-30

## 2023-05-30 VITALS
WEIGHT: 199.8 LBS | DIASTOLIC BLOOD PRESSURE: 82 MMHG | OXYGEN SATURATION: 96 % | HEART RATE: 106 BPM | TEMPERATURE: 98.4 F | HEIGHT: 66 IN | SYSTOLIC BLOOD PRESSURE: 146 MMHG | BODY MASS INDEX: 32.11 KG/M2

## 2023-05-30 DIAGNOSIS — L70.9 ACNE, UNSPECIFIED ACNE TYPE: ICD-10-CM

## 2023-05-30 DIAGNOSIS — F41.9 ANXIETY: Primary | ICD-10-CM

## 2023-05-30 DIAGNOSIS — R63.5 RAPID WEIGHT GAIN: ICD-10-CM

## 2023-05-30 DIAGNOSIS — Z30.011 ENCOUNTER FOR PRESCRIPTION OF ORAL CONTRACEPTIVES: ICD-10-CM

## 2023-05-30 DIAGNOSIS — N94.6 DYSMENORRHEA: ICD-10-CM

## 2023-05-30 DIAGNOSIS — E03.9 ACQUIRED HYPOTHYROIDISM: ICD-10-CM

## 2023-05-30 DIAGNOSIS — Z12.4 CERVICAL CANCER SCREENING: ICD-10-CM

## 2023-05-30 DIAGNOSIS — I10 HYPERTENSION, UNSPECIFIED TYPE: ICD-10-CM

## 2023-05-30 DIAGNOSIS — F32.A DEPRESSION, UNSPECIFIED DEPRESSION TYPE: ICD-10-CM

## 2023-05-30 NOTE — PATIENT INSTRUCTIONS
Please get labs done as ordered  You are due for your cervical cancer screening  If you decide that you don't want to come here, you can see gynecology  I would strongly recommend you stop taking your oral contraceptives  See cardiology for your elevated blood pressure  See psychiatry for the anxiety and depression  Please call me if you need assistance scheduling  Schedule appt with endocrinology  DASH Eating Plan   WHAT YOU NEED TO KNOW:   The DASH (Dietary Approaches to Stop Hypertension) Eating Plan is designed to help prevent or lower high blood pressure  It can also help to lower LDL (bad) cholesterol and decrease your risk for heart disease  The plan is low in sodium, sugar, unhealthy fats, and total fat  It is high in potassium, calcium, magnesium, and fiber  These nutrients are added when you eat more fruits, vegetables, and whole grains  With the DASH eating plan, you need to eat a certain number of servings from each food group  This will help you get enough of certain nutrients and limit others  The amount of servings you should eat depends on how many calories you need  Your dietitian can help you create meal plans with the right number of servings for each food group  DISCHARGE INSTRUCTIONS:   What you need to know about sodium:  Your dietitian will tell you how much sodium is safe for you to have each day  People with high blood pressure should have no more than 1,500 to 2,300 mg of sodium in a day  A teaspoon (tsp) of salt has 2,300 mg of sodium  This may seem like a difficult goal, but small changes to the foods you eat can make a big difference  Your healthcare provider or dietitian can help you create a meal plan that follows your sodium limit  Read food labels  Food labels can help you choose foods that are low in sodium  The amount of sodium is listed in milligrams (mg)   The % Daily Value (DV) column tells you how much of your daily needs are met by 1 serving of the food for each nutrient listed  Choose foods that have less than 5% of the DV of sodium  These foods are considered low in sodium  Foods that have 20% or more of the DV of sodium are considered high in sodium  Avoid foods that have more than 300 mg of sodium in each serving  Choose foods that say low-sodium, reduced-sodium, or no salt added on the food label  Limit added salt  Do not salt food at the table if you add salt when you cook  Use herbs and spices, such as onions, garlic, and salt-free seasonings to add flavor  Try lemon or lime juice or vinegar to add a tart flavor  Use hot peppers or a small amount of hot pepper sauce to add a spicy flavor  Limit foods high in added salt, such as the following:    Seasonings made with salt, such as garlic salt, celery salt, onion salt, seasoned salt, meat tenderizers, and monosodium glutamate (MSG)    Miso soup and canned or dried soup mixes    Regular soy sauce, barbecue sauce, teriyaki sauce, steak sauce, Worcestershire sauce, and most flavored vinegars    Snack foods, such as salted chips, popcorn, pretzels, pork rinds, salted crackers, and salted nuts    Frozen foods, such as dinners, entrees, vegetables with sauces, and breaded meats    Ask about salt substitutes  Ask your healthcare provider if you may use salt substitutes  Some salt substitutes have ingredients that can be harmful if you have certain health conditions  Choose foods carefully at restaurants  Meals from restaurants, especially fast food restaurants, are often high in sodium  Some restaurants have nutrition information that tells you the amount of sodium in their foods  Ask to have your food prepared with less, or no salt  What you need to know about fats:  Healthy fats include unsaturated fats and omega-3 fatty acids  Unhealthy fats include saturated fats and trans fats    Include healthy fats, such as the following:      Cooking oils, such as soybean, canola, olive, or sunflower    Fatty fish, such as salmon, tuna, mackerel, or sardines    Flaxseed oil or ground flaxseed    ½ cup of cooked beans, such as black beans, kidney beans, or garcia beans    1½ ounces of low-sodium nuts, such as almonds or walnuts    Low-sugar, low-sodium peanut butter    Seeds such as bill seeds or sunflower seeds       Limit or do not have unhealthy fats, such as the following:      Foods that contain fat from animals, such as fatty meats, whole milk, butter, and cream    Shortening, stick margarine, palm oil, and coconut oil    Full-fat or creamy salad dressing    Creamy soup    Crackers, chips, and baked goods made with margarine or shortening    Foods that are fried in unhealthy fats    Gravy and sauces, such as Matt or cheese sauces    What you need to know about carbohydrates (carbs): All carbs break down into sugar  Complex carbs contain more fiber than simple carbs  This means complex carbs go into the bloodstream more slowly and cause less of a blood sugar spike  Try to include more complex carbs and fewer simple carbs  Include complex carbs, such as the followin slice of whole-grain bread    1 ounce of dry cereal that does not contain added sugar    ½ cup of cooked oatmeal    2 ounces of cooked whole-grain pasta    ½ cup of cooked brown rice    Limit or do not have simple carbs, such as the following:      [employer], such as doughnuts, pastries, and cookies    Mixes for cornbread and biscuits    White rice and pasta mixes, such as boxed macaroni and cheese    Instant and cold cereals that contain sugar    Jelly, jam, and ice cream that contain sugar    Condiments such as ketchup    Drinks high in sugar, such as soft drinks, lemonade, and fruit juice    What you need to know about vegetables and fruits:  Vegetables and fruits can be fresh, frozen, or canned  If possible, try to choose low-sodium canned options    Include a variety of vegetables and fruits, such as the followin medium apple, pear, or peach (about ½ cup chopped)    ½ small banana    ½ cup berries, such as blueberries, strawberries, or blackberries    1 cup of raw leafy greens, such as lettuce, spinach, kale, or matthew greens    ½ cup of frozen or canned (no added salt) vegetables, such as green beans    ½ cup of fresh, frozen, or canned fruit (canned in light syrup or fruit juice)    ½ cup of vegetable or fruit juice    Limit or do not have vegetables and fruits made in the following ways:      Frozen fruit such as cherries that have added sugar    Fruit in cream or butter sauce    Canned vegetables that are high in sodium    Sauerkraut, pickled vegetables, and other foods prepared in brine    Fried vegetables or vegetables in butter or high-fat sauces    What you need to know about protein foods: Include lean or low-fat protein foods, such as the following:      Poultry (chicken, turkey) with no skin    Fish (especially fatty fish, such as salmon, fresh tuna, or mackerel)    Lean beef and pork (loin, round, extra lean hamburger)    Egg whites and egg substitutes    1 cup of nonfat (skim) or 1% milk    1½ ounces of fat-free or low-fat cheese    6 ounces of nonfat or low-fat yogurt    Limit or do not have high-fat protein foods, such as the following:      Smoked or cured meat, such as corned beef, sharma, ham, hot dogs, and sausage    Canned beans and canned meats or spreads, such as potted meats, sardines, anchovies, and imitation seafood    Deli or lunch meats, such as bologna, ham, turkey, and roast beef    High-fat meat (T-bone steak, regular hamburger, and ribs)    Whole eggs and egg yolks    Whole milk, 2% milk, and cream    Regular cheese and processed cheese    Other guidelines to follow:   Maintain a healthy weight  Your risk for heart disease is higher if you are overweight  Your healthcare provider may suggest that you lose weight if you are overweight   You can lose weight by eating fewer calories and foods that have added sugars and fat  The DASH meal plan can help you do this  Decrease calories by eating smaller portions at each meal and fewer snacks  Ask your healthcare provider for more information about how to lose weight  Exercise regularly  Regular exercise can help you reach or maintain a healthy weight  Regular exercise can also help decrease your blood pressure and improve your cholesterol levels  Get 30 minutes or more of moderate exercise each day of the week  To lose weight, get at least 60 minutes of exercise  Talk to your healthcare provider about the best exercise program for you  Limit alcohol  Women should limit alcohol to 1 drink a day  Men should limit alcohol to 2 drinks a day  A drink of alcohol is 12 ounces of beer, 5 ounces of wine, or 1½ ounces of liquor  For more information:   [facility] [address]  [address]  [place] [address]  Phone: 3- 881 - [phone]  Web Address: [facility] no    © Copyright [employer]ative 2022 Information is for End User's use only and may not be sold, redistributed or otherwise used for commercial purposes  The above information is an  only  It is not intended as medical advice for individual conditions or treatments  Talk to your doctor, nurse or pharmacist before following any medical regimen to see if it is safe and effective for you

## 2023-06-01 ENCOUNTER — TELEPHONE (OUTPATIENT)
Dept: FAMILY MEDICINE CLINIC | Facility: CLINIC | Age: 23
End: 2023-06-01

## 2023-06-01 NOTE — TELEPHONE ENCOUNTER
Dr Lety Steele appreciated packet of forms for  completion  I spoke with the patient and she is scheduled for her Annual Physical on 6/28/2023, at which time titers and Quantiferon Gold test can be ordered and the forms can be completed  Pt aware

## 2023-06-01 NOTE — TELEPHONE ENCOUNTER
Titers -    Received a packet of forms dropped off for completion for patient's university program     Patient needs titers completed and tw-step TB skin test OR Quantiferon Gold  I spoke with the patient and she confirmed that the titers are requested by the program     Patient's last OV 5/30/2023  Please review

## 2023-06-01 NOTE — TELEPHONE ENCOUNTER
Titers for what? I will need to see forms  Looks like she is due for PE on 6/27/23     Can [facility] PE and review forms/order any necessary titers/ TB screening at her visit

## 2023-06-09 DIAGNOSIS — F32.A DEPRESSION, UNSPECIFIED DEPRESSION TYPE: ICD-10-CM

## 2023-06-09 RX ORDER — BUPROPION HYDROCHLORIDE 300 MG/1
300 TABLET ORAL EVERY MORNING
Qty: 90 TABLET | Refills: 0 | Status: SHIPPED | OUTPATIENT
Start: 2023-06-09

## 2023-06-12 ENCOUNTER — LAB (OUTPATIENT)
Dept: LAB | Facility: CLINIC | Age: 23
End: 2023-06-12
Payer: COMMERCIAL

## 2023-06-12 ENCOUNTER — TELEPHONE (OUTPATIENT)
Dept: FAMILY MEDICINE CLINIC | Facility: CLINIC | Age: 23
End: 2023-06-12

## 2023-06-12 DIAGNOSIS — Z13.0 SCREENING FOR DEFICIENCY ANEMIA: ICD-10-CM

## 2023-06-12 DIAGNOSIS — R53.83 OTHER FATIGUE: ICD-10-CM

## 2023-06-12 DIAGNOSIS — R68.89 CUSHINGOID FACIES: ICD-10-CM

## 2023-06-12 DIAGNOSIS — R03.0 ELEVATED BLOOD-PRESSURE READING WITHOUT DIAGNOSIS OF HYPERTENSION: ICD-10-CM

## 2023-06-12 DIAGNOSIS — E03.9 ACQUIRED HYPOTHYROIDISM: Primary | ICD-10-CM

## 2023-06-12 DIAGNOSIS — Z13.6 SCREENING FOR CARDIOVASCULAR CONDITION: ICD-10-CM

## 2023-06-12 DIAGNOSIS — E03.9 ACQUIRED HYPOTHYROIDISM: ICD-10-CM

## 2023-06-12 DIAGNOSIS — R63.5 WEIGHT GAIN: ICD-10-CM

## 2023-06-12 DIAGNOSIS — Z13.29 SCREENING FOR THYROID DISORDER: ICD-10-CM

## 2023-06-12 DIAGNOSIS — K58.2 IRRITABLE BOWEL SYNDROME WITH BOTH CONSTIPATION AND DIARRHEA: ICD-10-CM

## 2023-06-12 DIAGNOSIS — Z13.1 SCREENING FOR DIABETES MELLITUS: ICD-10-CM

## 2023-06-12 LAB
ALBUMIN SERPL BCP-MCNC: 3.9 G/DL (ref 3.5–5)
ALP SERPL-CCNC: 52 U/L (ref 46–116)
ALT SERPL W P-5'-P-CCNC: 35 U/L (ref 12–78)
ANION GAP SERPL CALCULATED.3IONS-SCNC: 3 MMOL/L (ref 4–13)
AST SERPL W P-5'-P-CCNC: 19 U/L (ref 5–45)
BASOPHILS # BLD AUTO: 0.08 THOUSANDS/ÂΜL (ref 0–0.1)
BASOPHILS NFR BLD AUTO: 1 % (ref 0–1)
BILIRUB SERPL-MCNC: 0.41 MG/DL (ref 0.2–1)
BUN SERPL-MCNC: 8 MG/DL (ref 5–25)
CALCIUM SERPL-MCNC: 9.5 MG/DL (ref 8.3–10.1)
CHLORIDE SERPL-SCNC: 111 MMOL/L (ref 96–108)
CHOLEST SERPL-MCNC: 187 MG/DL
CO2 SERPL-SCNC: 23 MMOL/L (ref 21–32)
CREAT SERPL-MCNC: 0.93 MG/DL (ref 0.6–1.3)
EOSINOPHIL # BLD AUTO: 0.16 THOUSAND/ÂΜL (ref 0–0.61)
EOSINOPHIL NFR BLD AUTO: 2 % (ref 0–6)
ERYTHROCYTE [DISTWIDTH] IN BLOOD BY AUTOMATED COUNT: 12.2 % (ref 11.6–15.1)
GFR SERPL CREATININE-BSD FRML MDRD: 88 ML/MIN/1.73SQ M
GLUCOSE P FAST SERPL-MCNC: 87 MG/DL (ref 65–99)
HCT VFR BLD AUTO: 45.7 % (ref 34.8–46.1)
HDLC SERPL-MCNC: 62 MG/DL
HGB BLD-MCNC: 15.5 G/DL (ref 11.5–15.4)
IMM GRANULOCYTES # BLD AUTO: 0.03 THOUSAND/UL (ref 0–0.2)
IMM GRANULOCYTES NFR BLD AUTO: 0 % (ref 0–2)
LDLC SERPL CALC-MCNC: 95 MG/DL (ref 0–100)
LYMPHOCYTES # BLD AUTO: 3.03 THOUSANDS/ÂΜL (ref 0.6–4.47)
LYMPHOCYTES NFR BLD AUTO: 35 % (ref 14–44)
MCH RBC QN AUTO: 30.5 PG (ref 26.8–34.3)
MCHC RBC AUTO-ENTMCNC: 33.9 G/DL (ref 31.4–37.4)
MCV RBC AUTO: 90 FL (ref 82–98)
MONOCYTES # BLD AUTO: 0.62 THOUSAND/ÂΜL (ref 0.17–1.22)
MONOCYTES NFR BLD AUTO: 7 % (ref 4–12)
NEUTROPHILS # BLD AUTO: 4.8 THOUSANDS/ÂΜL (ref 1.85–7.62)
NEUTS SEG NFR BLD AUTO: 55 % (ref 43–75)
NONHDLC SERPL-MCNC: 125 MG/DL
NRBC BLD AUTO-RTO: 0 /100 WBCS
PLATELET # BLD AUTO: 326 THOUSANDS/UL (ref 149–390)
PMV BLD AUTO: 10.5 FL (ref 8.9–12.7)
POTASSIUM SERPL-SCNC: 3.9 MMOL/L (ref 3.5–5.3)
PROT SERPL-MCNC: 7.6 G/DL (ref 6.4–8.4)
RBC # BLD AUTO: 5.09 MILLION/UL (ref 3.81–5.12)
SODIUM SERPL-SCNC: 137 MMOL/L (ref 135–147)
T4 FREE SERPL-MCNC: 0.97 NG/DL (ref 0.61–1.12)
TRIGL SERPL-MCNC: 149 MG/DL
TSH SERPL DL<=0.05 MIU/L-ACNC: 5.39 UIU/ML (ref 0.45–4.5)
WBC # BLD AUTO: 8.72 THOUSAND/UL (ref 4.31–10.16)

## 2023-06-12 PROCEDURE — 84244 ASSAY OF RENIN: CPT

## 2023-06-12 PROCEDURE — 80053 COMPREHEN METABOLIC PANEL: CPT

## 2023-06-12 PROCEDURE — 80061 LIPID PANEL: CPT

## 2023-06-12 PROCEDURE — 82088 ASSAY OF ALDOSTERONE: CPT

## 2023-06-12 PROCEDURE — 84439 ASSAY OF FREE THYROXINE: CPT

## 2023-06-12 PROCEDURE — 85025 COMPLETE CBC W/AUTO DIFF WBC: CPT

## 2023-06-12 PROCEDURE — 36415 COLL VENOUS BLD VENIPUNCTURE: CPT

## 2023-06-12 PROCEDURE — 84443 ASSAY THYROID STIM HORMONE: CPT

## 2023-06-12 RX ORDER — LEVOTHYROXINE SODIUM 0.07 MG/1
75 TABLET ORAL
Qty: 90 TABLET | Refills: 3 | Status: SHIPPED | OUTPATIENT
Start: 2023-06-12 | End: 2023-08-24

## 2023-06-12 NOTE — TELEPHONE ENCOUNTER
Results -    I left a VM advising pt to call the office for recent lab results and medication dosing advice

## 2023-06-12 NOTE — TELEPHONE ENCOUNTER
----- Message from Cornelia Cotton DO sent at 6/12/2023  5:26 PM EDT -----  Can let patient know that her thyroid remains underactive  Will increase dose again to 75 mcg daily and recheck again in 6-8 weeks

## 2023-06-12 NOTE — RESULT ENCOUNTER NOTE
Can let patient know that her thyroid remains underactive  Will increase dose again to 75 mcg daily and recheck again in 6-8 weeks

## 2023-06-20 ENCOUNTER — TELEPHONE (OUTPATIENT)
Dept: PSYCHIATRY | Facility: CLINIC | Age: 23
End: 2023-06-20

## 2023-06-23 LAB
ALDOST SERPL-MCNC: 35.7 NG/DL (ref 0–30)
ALDOST/RENIN PLAS-RTO: 9.3 {RATIO} (ref 0–30)
RENIN PLAS-CCNC: 3.83 NG/ML/HR (ref 0.17–5.38)

## 2023-06-28 ENCOUNTER — OFFICE VISIT (OUTPATIENT)
Dept: FAMILY MEDICINE CLINIC | Facility: CLINIC | Age: 23
End: 2023-06-28
Payer: COMMERCIAL

## 2023-06-28 ENCOUNTER — TELEPHONE (OUTPATIENT)
Dept: FAMILY MEDICINE CLINIC | Facility: CLINIC | Age: 23
End: 2023-06-28

## 2023-06-28 VITALS
TEMPERATURE: 99.2 F | BODY MASS INDEX: 32.43 KG/M2 | OXYGEN SATURATION: 98 % | SYSTOLIC BLOOD PRESSURE: 130 MMHG | WEIGHT: 201.8 LBS | DIASTOLIC BLOOD PRESSURE: 82 MMHG | HEART RATE: 103 BPM | HEIGHT: 66 IN

## 2023-06-28 DIAGNOSIS — Z23 ENCOUNTER FOR IMMUNIZATION: ICD-10-CM

## 2023-06-28 DIAGNOSIS — Z00.00 ANNUAL PHYSICAL EXAM: Primary | ICD-10-CM

## 2023-06-28 DIAGNOSIS — E03.9 ACQUIRED HYPOTHYROIDISM: ICD-10-CM

## 2023-06-28 DIAGNOSIS — Z11.1 SCREENING-PULMONARY TB: ICD-10-CM

## 2023-06-28 DIAGNOSIS — I10 HYPERTENSION, UNSPECIFIED TYPE: ICD-10-CM

## 2023-06-28 DIAGNOSIS — Z01.84 IMMUNITY STATUS TESTING: ICD-10-CM

## 2023-06-28 PROCEDURE — 90715 TDAP VACCINE 7 YRS/> IM: CPT

## 2023-06-28 PROCEDURE — 90471 IMMUNIZATION ADMIN: CPT

## 2023-06-28 PROCEDURE — 99395 PREV VISIT EST AGE 18-39: CPT | Performed by: FAMILY MEDICINE

## 2023-06-28 RX ORDER — RIMEGEPANT SULFATE 75 MG/75MG
1 TABLET, ORALLY DISINTEGRATING ORAL AS NEEDED
COMMUNITY
Start: 2023-06-21

## 2023-06-28 NOTE — TELEPHONE ENCOUNTER
Immunizations -    Patient was seen in the office on 6/28/2023 for annual physical, follow-up, and form completion for univerisyt requirements  Patient needs titers completed - Dr Susanne Heart ordered on 6/28/2023  Form completion due upon completion of resulted titers  I left a VM for the patient to call back- as we are requesting that she re-request or furnish her past immunization record  Immunization record currently obtained and scanned into the chart is very difficult to read  Awaiting call back from patient

## 2023-06-28 NOTE — PROGRESS NOTES
ADULT [facility]NUAL [facility]    NAME: Niki Cash  AGE: 25 y o  SEX: female  : 2000     DATE: 2023     Assessment and Plan:     Problem List Items Addressed This Visit        Endocrine    Acquired hypothyroidism  Lab Results   Component Value Date    [id number] 5 390 (H) 2023    TSH 7 350 (H) 2022     Dose was increased to 75 mcg after last labs  Has rx to have lab repeated 6-8 weeks  Cardiovascular and Mediastinum    Hypertension  Patient stopped oral contraceptives as recommended at time of her last visit  bp has improved since stopping OC's  I had checked Aldosterone renin ratio which resulted as normal    She was encouraged to call cardiology as recommended for appt regarding her elevated blood pressure  Follow low sodium diet  No meds today as bp has improved with cessation of her oral contraceptives   Other Visit Diagnoses     Annual physical exam    -  Primary  School physical form completed  She needs titers for MMR, varicella and hep b which were ordered today along with quantiferon gold      BMI 32 0-32 9,adult        Immunity status testing        Relevant Orders    Rubeola antibody IgG    Mumps antibody, IgG    Rubella antibody, IgG    Varicella Zoster Virus Ab (Immunity Scr),ACIF (S)    Hepatitis B surface antibody    Screening-pulmonary TB        Relevant Orders    Quantiferon TB Gold Plus          Immunizations and preventive care screenings were discussed with patient today  Appropriate education was printed on patient's after visit summary  Counseling:  Alcohol/drug use: discussed moderation in alcohol intake, the recommendations for healthy alcohol use, and avoidance of illicit drug use  Dental Health: discussed importance of regular tooth brushing, flossing, and dental visits    Sexual health: discussed sexually transmitted diseases, partner selection, use of condoms, avoidance of unintended pregnancy, and contraceptive alternatives  Exercise: the importance of regular exercise/physical activity was discussed  Recommend exercise 3-5 times per week for at least 30 minutes  No follow-ups on file  Chief Complaint:     Chief Complaint   Patient presents with   • Annual Exam     Patient is in the office for her Annual Physical and form completion to meet university requirements  Stated concerns - none stated   PAP - agreeable to schedule for another time  HIV/ Hep C screen - declined  Lab - [facility]       History of Present Illness:     Adult Annual Physical   Patient is a 25year old female with hypothyroidism, allergies, asthma, migraine headaches, depression, anxiety and elevated blood pressure who is being seen today  for a comprehensive physical exam  She needs forms completed for university [employer]  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  Requires that she has proof of MMR, varicella, hep b vaccine and tuberculosis testing as well as titers  At time of her last visit on 5/30/23, bp remained elevated at 146/82  Had been elevated at previous visits as well  She was advised to d/c her oral contraceptive and I ordered an ARR to rule out hyperaldosteronism  The ARR was normal  She was referred to see cardiology due to young age and elevated blood pressure  Nothing scheduled  TSH was ordered at time of her visit on 5/30/23  TSH completed and elevated at 5 390  her synthroid dose was increased to 75 mcg daily  She has rx to have this repeated in 6-8 weeks  Because of her dysmenorrhea and recommendation to d/c the oral contraceptives, provided referral to gyn to discuss other treatment options  She is now due for cervical cancer screening as well  She has not scheduled  Has appt with endocrinology in august to discuss her rapid weight gain, striae  She is still on waiting list to see psychiatry for her depression and anxiety   To recap her history ---    In  June of 2022, patient was seeing  psychiatrist in [address] [place] [address] Street  Was taking wellbutrin   Mg daily, abilify 5 mg daily, and cymbalta 60 mg daily  Mother had messaged our office in august requesting that we take over her medication prescribing until she could find a new psychiatrist  Unfortunately has not been able to find one  At visit on 3/28/23, she noted that her anxiety has worsened over this past school year and at the 3/28/23 visit, she reported that she was feeling nervous, having difficulty focusing and not sleeping well  She was not seeing counselor  Prior to her being on wellbutrin, she was on prozac  prozac made her more anxious  She also tried lexapro and states that it didn't really do anything  States that cymbalta was ineffective and made her tired  When she was on cymbalta, was also on abilify  She was started on buspar 5 mg bid  buspar ineffective and she self discontinued  She feels that moods are stable and she is on wait list to see psychiatry  PHQ-2/9 Depression Screening    Little interest or pleasure in doing things: 1 - several days  Feeling down, depressed, or hopeless: 1 - several days  Trouble falling or staying asleep, or sleeping too much: 0 - not at all  Feeling tired or having little energy: 2 - more than half the days  Feeling bad about yourself - or that you are a failure or have let yourself or your family down: 0 - not at all  Trouble concentrating on things, such as reading the newspaper or watching television: 0 - not at all  Moving or speaking so slowly that other people could have noticed   Or the opposite - being so fidgety or restless that you have been moving around a lot more than usual: 0 - not at all  Thoughts that you would be better off dead, or of hurting yourself in some way: 0 - not at all       KOKI-7 Flowsheet Screening    Flowsheet Row Most Recent Value   Over the last 2 weeks, how often have you been bothered by any of the following problems? Feeling nervous, anxious, or on edge 1   Not being able to stop or control worrying 1   Worrying too much about different things 1   Trouble relaxing 1   Being so restless that it is hard to sit still 0   Becoming easily annoyed or irritable 1   Feeling afraid as if something awful might happen 1   KOKI-7 Total Score 6              Diet and Physical Activity  Diet/Nutrition: well balanced diet  Exercise: walking for exercise; at least 3-4 times per week  Depression Screening  PHQ-2/9 Depression Screening    Little interest or pleasure in doing things: 1 - several days  Feeling down, depressed, or hopeless: 1 - several days  Trouble falling or staying asleep, or sleeping too much: 0 - not at all  Feeling tired or having little energy: 2 - more than half the days  Feeling bad about yourself - or that you are a failure or have let yourself or your family down: 0 - not at all  Trouble concentrating on things, such as reading the newspaper or watching television: 0 - not at all  Moving or speaking so slowly that other people could have noticed  Or the opposite - being so fidgety or restless that you have been moving around a lot more than usual: 0 - not at all  Thoughts that you would be better off dead, or of hurting yourself in some way: 0 - not at all       General Health  Sleep: sleeps well  Hearing: normal - bilateral   Vision: no vision problems  Dental: regular dental visits  /GYN Health  Last menstrual period: September of 2022  She was taking her oral contraceptive continuously  Just oral contraceptives  Contraceptive method: none  is not sexually active nor has she been  Review of Systems:     Review of Systems   Past Medical History:     Past Medical History:   Diagnosis Date   • Anxiety    • Asthma    • Depression    • Hypothyroid    • Migraines    • Pneumonia due to COVID-19 virus 09/19/2022    seen in [place] urgent [facility]   CXR reviewed and showed no focal consolidation  was put on paxlovid  see scanned progress note/xray report      Past Surgical History:     History reviewed  No pertinent surgical history     Social History:     Social History     Socioeconomic History   • Marital status: Single     Spouse name: None   • Number of children: None   • Years of education: None   • Highest education level: None   Occupational History   • Occupation: Student   Tobacco Use   • Smoking status: Never   • Smokeless tobacco: Never   Vaping Use   • Vaping Use: Never used   Substance and Sexual Activity   • Alcohol use: Never     Comment: Does not drink alcohol - As per [employer]    • Drug use: Never     Comment: No - As per [employer]    • Sexual activity: Never   Other Topics Concern   • None   Social History Narrative    Sexual abuse: No    Exercise: Occasionally    Domestic violence: No     History of drug/alcohol abuse: Denies    Lives with parents    Is a college student at [employer]     Social Determinants of Health     Financial Resource Strain: Not on file   Food Insecurity: Not on file   Transportation Needs: Not on file   Physical Activity: Not on file   Stress: Not on file   Social Connections: Not on file   Intimate Partner Violence: Not on file   Housing Stability: Not on file      Family History:     Family History   Problem Relation Age of Onset   • Hypertension Mother    • Completed Suicide  Father    • Hypertension Maternal Grandmother    • Heart disease Maternal Grandfather    • Hypertension Maternal Grandfather    • Diabetes Paternal Grandfather       Current Medications:     Current Outpatient Medications   Medication Sig Dispense Refill   • albuterol (PROVENTIL HFA,VENTOLIN HFA) 90 mcg/act inhaler      • buPROPion (WELLBUTRIN XL) 300 mg 24 hr tablet Take 1 tablet (300 mg total) by mouth every morning 90 tablet 0   • cetirizine (ZyrTEC) 10 mg tablet Take 1 tablet by mouth daily     • fluticasone (FLONASE) 50 mcg/act nasal spray 1 spray "into each nostril daily as needed     • hydrOXYzine HCL (ATARAX) 50 mg tablet Take 1 tablet (50 mg total) by mouth daily at bedtime 90 tablet 1   • levothyroxine (Synthroid) 75 mcg tablet Take 1 tablet (75 mcg total) by mouth daily in the early morning 90 tablet 3   • Nurtec 75 MG TBDP Take 1 tablet by mouth if needed     • Spacer/Aero-Holding Chambers (AeroChamber Plus Robe-Vu) MISC Take 1 Device by mouth 2 (two) times a day     • spironolactone (ALDACTONE) 50 mg tablet Take 1 tablet by mouth daily     • Symbicort 160-4 5 MCG/ACT inhaler      • topiramate (TOPAMAX) 50 MG tablet Take 50 mg by mouth 2 (two) times a day     • tretinoin (REFISSA) 0 05 % cream Apply 1 application  topically if needed     • triamcinolone (KENALOG) 0 5 % cream Apply topically 3 (three) times a day 30 g 0   • EPINEPHrine (EPIPEN) 0 3 mg/0 3 mL SOAJ as needed (Patient not taking: Reported on 6/28/2023)       No current facility-administered medications for this visit  Allergies: Allergies   Allergen Reactions   • Peanut (Diagnostic) - Food Allergy Anaphylaxis      Physical Exam:     /82 (BP Location: Left arm, Patient Position: Sitting, Cuff Size: Standard)   Pulse 103   Temp 99 2 °F (37 3 °C) (Tympanic)   Ht 5' 6\" (1 676 m)   Wt 91 5 kg (201 lb 12 8 oz)   LMP  (LMP Unknown)   SpO2 98%   BMI 32 57 kg/m²     Physical Exam  Vitals and nursing note reviewed  Constitutional:       General: She is not in acute distress  Appearance: Normal appearance  She is obese  She is not ill-appearing, toxic-appearing or diaphoretic  HENT:      Head: Normocephalic and atraumatic  Right Ear: Tympanic membrane normal       Left Ear: Tympanic membrane normal       Mouth/Throat:      Mouth: Mucous membranes are moist       Pharynx: No oropharyngeal exudate or posterior oropharyngeal erythema  Eyes:      Conjunctiva/sclera: Conjunctivae normal    Cardiovascular:      Rate and Rhythm: Regular rhythm  Tachycardia present        " Pulses: Normal pulses  Heart sounds: No murmur heard  Pulmonary:      Effort: Pulmonary effort is normal       Breath sounds: Normal breath sounds  Abdominal:      General: Abdomen is flat  Bowel sounds are normal  There is no distension  Palpations: Abdomen is soft  There is no mass  Tenderness: There is no abdominal tenderness  Musculoskeletal:      Cervical back: Normal range of motion and neck supple  Right lower leg: No edema  Left lower leg: No edema  Lymphadenopathy:      Cervical: No cervical adenopathy  Skin:     General: Skin is warm and dry  Comments: Acne  Striae abdomen, upper arms, medial thighs   Neurological:      General: No focal deficit present  Mental Status: She is alert and oriented to person, place, and time        Deep Tendon Reflexes: Reflexes normal    Psychiatric:         Mood and Affect: Mood normal           Taiwo Reis DO   [address] [facility]

## 2023-06-28 NOTE — TELEPHONE ENCOUNTER
LVM for the patient to inform her that the immunization details were found and updated in her chart and school form  Requested call back from the patient regarding most recent flu shot date, as one is needed for the university forms     Awaiting call back

## 2023-06-29 ENCOUNTER — APPOINTMENT (OUTPATIENT)
Dept: LAB | Facility: CLINIC | Age: 23
End: 2023-06-29
Payer: COMMERCIAL

## 2023-06-29 DIAGNOSIS — Z01.84 IMMUNITY STATUS TESTING: ICD-10-CM

## 2023-06-29 DIAGNOSIS — E03.9 ACQUIRED HYPOTHYROIDISM: ICD-10-CM

## 2023-06-29 DIAGNOSIS — Z11.1 SCREENING-PULMONARY TB: ICD-10-CM

## 2023-06-29 LAB
RUBV IGG SERPL IA-ACNC: 47.9 IU/ML
TSH SERPL DL<=0.05 MIU/L-ACNC: 1.48 UIU/ML (ref 0.45–4.5)

## 2023-06-29 PROCEDURE — 86762 RUBELLA ANTIBODY: CPT

## 2023-06-29 PROCEDURE — 86765 RUBEOLA ANTIBODY: CPT

## 2023-06-29 PROCEDURE — 86735 MUMPS ANTIBODY: CPT

## 2023-06-29 PROCEDURE — 86480 TB TEST CELL IMMUN MEASURE: CPT

## 2023-06-29 PROCEDURE — 84443 ASSAY THYROID STIM HORMONE: CPT

## 2023-06-29 PROCEDURE — 36415 COLL VENOUS BLD VENIPUNCTURE: CPT

## 2023-06-29 PROCEDURE — 86706 HEP B SURFACE ANTIBODY: CPT

## 2023-06-29 PROCEDURE — 86787 VARICELLA-ZOSTER ANTIBODY: CPT

## 2023-06-30 LAB
HBV SURFACE AB SER-ACNC: <3 MIU/ML
MEV IGG SER QL IA: NORMAL
MUV IGG SER QL IA: NORMAL
VZV IGG SER QL IA: NORMAL

## 2023-07-01 LAB
GAMMA INTERFERON BACKGROUND BLD IA-ACNC: 0.05 IU/ML
M TB IFN-G BLD-IMP: NEGATIVE
M TB IFN-G CD4+ BCKGRND COR BLD-ACNC: 0 IU/ML
M TB IFN-G CD4+ BCKGRND COR BLD-ACNC: 0 IU/ML
MITOGEN IGNF BCKGRD COR BLD-ACNC: >10 IU/ML

## 2023-07-05 ENCOUNTER — TELEPHONE (OUTPATIENT)
Dept: FAMILY MEDICINE CLINIC | Facility: CLINIC | Age: 23
End: 2023-07-05

## 2023-07-05 NOTE — TELEPHONE ENCOUNTER
Patient contacted in regards to form completion for university. Patient not immune to Hep B and will need repeat series followed by repeat titer in 4-8 weeks after 3rd dose. Hep B # 1 scheduled for 07/06/2023  Hep B # 2 scheduled for 08/07/2023  Hep B # 3 scheduled for 01/08/2024    Copy of form placed in patient chart.

## 2023-07-05 NOTE — TELEPHONE ENCOUNTER
Received remainder of her lab tests  Is immune to measles, mumps, rubella and varicella. Not immune to hep b so must complete another series. Please schedule as nurse visit. Consent Type: Consent 1 (Standard)

## 2023-07-05 NOTE — TELEPHONE ENCOUNTER
LMOM for pt to call back for results    ----- Message from Christina Hernandez DO sent at 7/5/2023  8:51 AM EDT -----  Can let patient know that her tb blood test was negative.

## 2023-07-06 ENCOUNTER — CLINICAL SUPPORT (OUTPATIENT)
Dept: FAMILY MEDICINE CLINIC | Facility: CLINIC | Age: 23
End: 2023-07-06
Payer: COMMERCIAL

## 2023-07-06 DIAGNOSIS — Z23 ENCOUNTER FOR IMMUNIZATION: Primary | ICD-10-CM

## 2023-07-06 PROCEDURE — G0010 ADMIN HEPATITIS B VACCINE: HCPCS

## 2023-07-06 PROCEDURE — 90746 HEPB VACCINE 3 DOSE ADULT IM: CPT

## 2023-08-08 ENCOUNTER — CLINICAL SUPPORT (OUTPATIENT)
Dept: FAMILY MEDICINE CLINIC | Facility: CLINIC | Age: 23
End: 2023-08-08
Payer: COMMERCIAL

## 2023-08-08 DIAGNOSIS — Z23 NEED FOR HEPATITIS B VACCINATION: Primary | ICD-10-CM

## 2023-08-08 PROCEDURE — 90746 HEPB VACCINE 3 DOSE ADULT IM: CPT

## 2023-08-08 PROCEDURE — G0010 ADMIN HEPATITIS B VACCINE: HCPCS

## 2023-08-10 ENCOUNTER — APPOINTMENT (OUTPATIENT)
Dept: LAB | Facility: CLINIC | Age: 23
End: 2023-08-10
Payer: COMMERCIAL

## 2023-08-10 ENCOUNTER — OFFICE VISIT (OUTPATIENT)
Dept: ENDOCRINOLOGY | Facility: CLINIC | Age: 23
End: 2023-08-10
Payer: COMMERCIAL

## 2023-08-10 VITALS
OXYGEN SATURATION: 99 % | HEIGHT: 66 IN | DIASTOLIC BLOOD PRESSURE: 92 MMHG | HEART RATE: 109 BPM | WEIGHT: 209.6 LBS | BODY MASS INDEX: 33.68 KG/M2 | SYSTOLIC BLOOD PRESSURE: 138 MMHG

## 2023-08-10 DIAGNOSIS — E03.9 ACQUIRED HYPOTHYROIDISM: ICD-10-CM

## 2023-08-10 DIAGNOSIS — L70.9 ACNE, UNSPECIFIED ACNE TYPE: ICD-10-CM

## 2023-08-10 DIAGNOSIS — R63.5 RAPID WEIGHT GAIN: ICD-10-CM

## 2023-08-10 LAB
PROLACTIN SERPL-MCNC: 22.99 NG/ML (ref 3.34–26.72)
T4 FREE SERPL-MCNC: 0.89 NG/DL (ref 0.61–1.12)
TSH SERPL DL<=0.05 MIU/L-ACNC: 5 UIU/ML (ref 0.45–4.5)

## 2023-08-10 PROCEDURE — 84305 ASSAY OF SOMATOMEDIN: CPT | Performed by: INTERNAL MEDICINE

## 2023-08-10 PROCEDURE — 84443 ASSAY THYROID STIM HORMONE: CPT | Performed by: INTERNAL MEDICINE

## 2023-08-10 PROCEDURE — 36415 COLL VENOUS BLD VENIPUNCTURE: CPT | Performed by: INTERNAL MEDICINE

## 2023-08-10 PROCEDURE — 84439 ASSAY OF FREE THYROXINE: CPT | Performed by: INTERNAL MEDICINE

## 2023-08-10 PROCEDURE — 84402 ASSAY OF FREE TESTOSTERONE: CPT | Performed by: INTERNAL MEDICINE

## 2023-08-10 PROCEDURE — 82627 DEHYDROEPIANDROSTERONE: CPT | Performed by: INTERNAL MEDICINE

## 2023-08-10 PROCEDURE — 83498 ASY HYDROXYPROGESTERONE 17-D: CPT | Performed by: INTERNAL MEDICINE

## 2023-08-10 PROCEDURE — 84403 ASSAY OF TOTAL TESTOSTERONE: CPT | Performed by: INTERNAL MEDICINE

## 2023-08-10 PROCEDURE — 84146 ASSAY OF PROLACTIN: CPT | Performed by: INTERNAL MEDICINE

## 2023-08-10 PROCEDURE — 99244 OFF/OP CNSLTJ NEW/EST MOD 40: CPT | Performed by: INTERNAL MEDICINE

## 2023-08-10 RX ORDER — NORETHINDRONE ACETATE AND ETHINYL ESTRADIOL 1; .02 MG/1; MG/1
1 TABLET ORAL DAILY
COMMUNITY

## 2023-08-10 NOTE — PROGRESS NOTES
8/10/2023    Assessment/Plan      Diagnoses and all orders for this visit:    Acquired hypothyroidism  -     Ambulatory Referral to Endocrinology  -     DHEA-sulfate- Lab Collect  -     TSH, 3rd generation Lab Collect  -     T4, free- Lab Collect  -     17-Hydroxyprogesterone- Lab Collect  -     Testosterone, free, total Lab Collect  -     Prolactin Lab Collect  -     Insulin-like growth factor 1 (IGF-1) - Lab Collect  -     Cortisol, Free, Urine, 24 Hour    Acne, unspecified acne type  -     Ambulatory Referral to Endocrinology  -     DHEA-sulfate- Lab Collect  -     TSH, 3rd generation Lab Collect  -     T4, free- Lab Collect  -     17-Hydroxyprogesterone- Lab Collect  -     Testosterone, free, total Lab Collect  -     Prolactin Lab Collect  -     Insulin-like growth factor 1 (IGF-1) - Lab Collect  -     Cortisol, Free, Urine, 24 Hour    Rapid weight gain  -     Ambulatory Referral to Endocrinology  -     DHEA-sulfate- Lab Collect  -     TSH, 3rd generation Lab Collect  -     T4, free- Lab Collect  -     17-Hydroxyprogesterone- Lab Collect  -     Testosterone, free, total Lab Collect  -     Prolactin Lab Collect  -     Insulin-like growth factor 1 (IGF-1) - Lab Collect  -     Cortisol, Free, Urine, 24 Hour    Other orders  -     norethindrone-ethinyl estradiol (MICROGESTIN 1/20) 1-20 MG-MCG per tablet; Take 1 tablet by mouth daily        Assessment/Plan:  71-year-old female presents to establish care with endocrinology for hypothyroidism as well as discuss other symptoms of concern as noted in HPI. For her hypothyroidism, we will continue current regimen for now and update thyroid lab work. We did discuss consideration of brand specific thyroid hormone in the future. I have also recommended we do additional workup for symptoms of weight gain and acne as well as heat intolerance with labs as ordered above including a 24-hour urine free cortisol collection.   We will contact patient as results are available. CC: Hyperthyroidism consult    History of Present Illness     HPI: Dalton Malone is a 25y.o. year old female presents for consultation of hypothyroidism. She also reports some other symptoms of concern which we reviewed. She states within the past year or so, lower thyroid levels were reviewed. This was done in lab evaluation but also in conjunction with symptoms such as fatigue. She was started on thyroid hormone supplementation at a dose that was titrated up to 75 mcg daily. She takes this correctly. She reports energy level has improved. However she does endorse history of treatment for cystic acne which is improving on spironolactone which was started several months ago. She has had an unintentional weight gain of about 45 pounds over the past year or so. She also notes purple stretch marks on her legs, abdomen, inner upper arm. She does continue on a oral contraceptive pill as menstrual cycles are typically heavier and more painful if she is off of this. She does continue on spironolactone and is tolerating this well. She denies plans for pregnancy at this time. She is currently studying biology at the [employer] and will start her next semester at the end of the month. Review of Systems   Constitutional: Positive for fatigue and unexpected weight change. HENT: Negative for trouble swallowing and voice change. Eyes: Negative for visual disturbance. Respiratory: Negative for shortness of breath. Cardiovascular: Negative for palpitations and leg swelling. Gastrointestinal: Negative for abdominal pain, nausea and vomiting. Endocrine: Positive for heat intolerance. Negative for polydipsia and polyuria. Musculoskeletal: Negative for arthralgias and myalgias. Skin: Negative for rash. Neurological: Negative for dizziness, tremors and weakness. Hematological: Negative for adenopathy.    Psychiatric/Behavioral: Negative for agitation and confusion. Historical Information   Past Medical History:   Diagnosis Date   • Anxiety    • Asthma    • Depression    • Hypothyroid    • Migraines    • Pneumonia due to COVID-19 virus 09/19/2022    seen in [place] urgent care. CXR reviewed and showed no focal consolidation. was put on paxlovid. see scanned progress note/xray report     No past surgical history on file.   Social History   Social History     Substance and Sexual Activity   Alcohol Use Never    Comment: Does not drink alcohol - As per [employer]      Social History     Substance and Sexual Activity   Drug Use Never    Comment: No - As per [employer]      Social History     Tobacco Use   Smoking Status Never   Smokeless Tobacco Never     Family History:   Family History   Problem Relation Age of Onset   • Hypertension Mother    • Completed Suicide  Father    • Hypertension Maternal Grandmother    • Heart disease Maternal Grandfather    • Hypertension Maternal Grandfather    • Diabetes Paternal Grandfather        Meds/Allergies   Current Outpatient Medications   Medication Sig Dispense Refill   • albuterol (PROVENTIL HFA,VENTOLIN HFA) 90 mcg/act inhaler      • cetirizine (ZyrTEC) 10 mg tablet Take 1 tablet by mouth daily     • EPINEPHrine (EPIPEN) 0.3 mg/0.3 mL SOAJ as needed PRN     • fluticasone (FLONASE) 50 mcg/act nasal spray 1 spray into each nostril daily as needed     • hydrOXYzine HCL (ATARAX) 50 mg tablet Take 1 tablet (50 mg total) by mouth daily at bedtime 90 tablet 1   • levothyroxine (Synthroid) 75 mcg tablet Take 1 tablet (75 mcg total) by mouth daily in the early morning 90 tablet 3   • norethindrone-ethinyl estradiol (MICROGESTIN 1/20) 1-20 MG-MCG per tablet Take 1 tablet by mouth daily     • Nurtec 75 MG TBDP Take 1 tablet by mouth if needed     • Spacer/Aero-Holding Chambers (AeroChamber Plus Robe-Vu) MISC Take 1 Device by mouth 2 (two) times a day     • spironolactone (ALDACTONE) 50 mg tablet Take 1 tablet by mouth daily • Symbicort 160-4.5 MCG/ACT inhaler      • topiramate (TOPAMAX) 50 MG tablet Take 50 mg by mouth 2 (two) times a day     • tretinoin (REFISSA) 0.05 % cream Apply 1 application. topically if needed     • triamcinolone (KENALOG) 0.5 % cream Apply topically 3 (three) times a day 30 g 0   • buPROPion (WELLBUTRIN XL) 300 mg 24 hr tablet Take 1 tablet (300 mg total) by mouth every morning (Patient not taking: Reported on 8/10/2023) 90 tablet 0     No current facility-administered medications for this visit. Allergies   Allergen Reactions   • Peanut (Diagnostic) - Food Allergy Anaphylaxis       Objective   Vitals: Blood pressure 138/92, pulse (!) 109, height 5' 6" (1.676 m), weight 95.1 kg (209 lb 9.6 oz), SpO2 99 %, not currently breastfeeding. Invasive Devices     Peripheral Intravenous Line  Duration           Peripheral IV 10/03/22 Left Antecubital 310 days                Physical Exam  Vitals reviewed. Constitutional:       General: She is not in acute distress. Appearance: She is well-developed. She is not diaphoretic. HENT:      Head: Normocephalic and atraumatic. Eyes:      Conjunctiva/sclera: Conjunctivae normal.      Pupils: Pupils are equal, round, and reactive to light. Neck:      Thyroid: No thyromegaly. Cardiovascular:      Rate and Rhythm: Normal rate and regular rhythm. Pulmonary:      Effort: Pulmonary effort is normal. No respiratory distress. Breath sounds: Normal breath sounds. Abdominal:      General: Bowel sounds are normal.      Palpations: Abdomen is soft. Musculoskeletal:         General: Normal range of motion. Cervical back: Normal range of motion and neck supple. Skin:     General: Skin is warm and dry. Findings: No rash. Neurological:      Mental Status: She is alert and oriented to person, place, and time. Motor: No abnormal muscle tone.    Psychiatric:         Behavior: Behavior normal.         The history was obtained from the review of the chart and from the patient.     Lab Results:      Component      Latest Ref Rng 4/1/2023 6/12/2023   Sodium      135 - 147 mmol/L  137    Potassium      3.5 - 5.3 mmol/L  3.9    Chloride      96 - 108 mmol/L  111 (H)    CO2      21 - 32 mmol/L  23    Anion Gap      4 - 13 mmol/L  3 (L)    BUN      5 - 25 mg/dL  8    Creatinine      0.60 - 1.30 mg/dL  0.93    GLUCOSE FASTING      65 - 99 mg/dL  87    Calcium      8.3 - 10.1 mg/dL  9.5    AST      5 - 45 U/L  19    ALT      12 - 78 U/L  35    Alkaline Phosphatase      46 - 116 U/L  52    Total Protein      6.4 - 8.4 g/dL  7.6    Albumin      3.5 - 5.0 g/dL  3.9    TOTAL BILIRUBIN      0.20 - 1.00 mg/dL  0.41    eGFR      ml/min/1.73sq m  88    IGA      87 - 352 mg/dL 202     GLIADIN IGA ANTIBODIES      0 - 19 units 4     GLIADIN IGG ANTIBODIES      0 - 19 units 6     Tissue Transglut Ab IGG      0 - 5 U/mL <2     TTG IGA      0 - 3 U/mL <2     ENDOMYSIAL IGA ANTIBODY      Negative  Negative     Renin      0.167 - 5.380 ng/mL/hr  3.831    Aldosterone      0.0 - 30.0 ng/dL  35.7 (H)    LINA/PRA RATIO      0.0 - 30.0   9.3    Cortisol - AM      4.2 - 22.4 ug/dL 17.3     ACTH      7.2 - 63.3 pg/mL 21.1     THYROID MICROSOMAL ANTIBODY      0 - 34 IU/mL 10     THYROGLOBULIN AB      0.0 - 0.9 IU/mL <1.0     Free T4      0.61 - 1.12 ng/dL 1.12  0.97    TSH 3RD GENERATON      0.450 - 4.500 uIU/mL  5.390 (H)      Component      Latest Ref Rng 6/29/2023   Sodium      135 - 147 mmol/L    Potassium      3.5 - 5.3 mmol/L    Chloride      96 - 108 mmol/L    CO2      21 - 32 mmol/L    Anion Gap      4 - 13 mmol/L    BUN      5 - 25 mg/dL    Creatinine      0.60 - 1.30 mg/dL    GLUCOSE FASTING      65 - 99 mg/dL    Calcium      8.3 - 10.1 mg/dL    AST      5 - 45 U/L    ALT      12 - 78 U/L    Alkaline Phosphatase      46 - 116 U/L    Total Protein      6.4 - 8.4 g/dL    Albumin      3.5 - 5.0 g/dL    TOTAL BILIRUBIN      0.20 - 1.00 mg/dL    eGFR      ml/min/1.73sq m    IGA      87 - 352 mg/dL    GLIADIN IGA ANTIBODIES      0 - 19 units    GLIADIN IGG ANTIBODIES      0 - 19 units    Tissue Transglut Ab IGG      0 - 5 U/mL    TTG IGA      0 - 3 U/mL    ENDOMYSIAL IGA ANTIBODY      Negative     Renin      0.167 - 5.380 ng/mL/hr    Aldosterone      0.0 - 30.0 ng/dL    LINA/PRA RATIO      0.0 - 30.0     Cortisol - AM      4.2 - 22.4 ug/dL    ACTH      7.2 - 63.3 pg/mL    THYROID MICROSOMAL ANTIBODY      0 - 34 IU/mL    THYROGLOBULIN AB      0.0 - 0.9 IU/mL    Free T4      0.61 - 1.12 ng/dL    TSH 3RD GENERATON      0.450 - 4.500 uIU/mL 1.478       Legend:  (H) High  (L) Low      Future Appointments   Date Time Provider [address]   1/8/2024  3:15 PM [facility]SE [facility]  [facility]       Portions of the record may have been created with voice recognition software. Occasional wrong word or "sound a like" substitutions may have occurred due to the inherent limitations of voice recognition software. Read the chart carefully and recognize, using context, where substitutions have occurred.

## 2023-08-11 LAB
DHEA-S SERPL-MCNC: 189 UG/DL (ref 110–431.7)
TESTOST FREE SERPL-MCNC: 2.8 PG/ML (ref 0–4.2)
TESTOST SERPL-MCNC: 24 NG/DL (ref 13–71)

## 2023-08-14 LAB — 17OHP SERPL-MCNC: 19 NG/DL

## 2023-08-15 LAB — IGF-I SERPL-MCNC: 163 NG/ML (ref 101–347)

## 2023-08-17 ENCOUNTER — APPOINTMENT (OUTPATIENT)
Dept: LAB | Facility: CLINIC | Age: 23
End: 2023-08-17
Payer: COMMERCIAL

## 2023-08-17 PROCEDURE — 82530 CORTISOL FREE: CPT | Performed by: INTERNAL MEDICINE

## 2023-08-21 DIAGNOSIS — J45.40 MODERATE PERSISTENT ASTHMA WITHOUT COMPLICATION: Primary | ICD-10-CM

## 2023-08-21 LAB
CORTIS F 24H UR-MRATE: 15 UG/24 HR (ref 6–42)
CORTIS F UR-MCNC: 6 UG/L

## 2023-08-21 RX ORDER — BUDESONIDE AND FORMOTEROL FUMARATE DIHYDRATE 160; 4.5 UG/1; UG/1
2 AEROSOL RESPIRATORY (INHALATION) 2 TIMES DAILY
Qty: 10.2 G | Refills: 0 | Status: SHIPPED | OUTPATIENT
Start: 2023-08-21

## 2023-08-24 DIAGNOSIS — E03.9 ACQUIRED HYPOTHYROIDISM: Primary | ICD-10-CM

## 2023-08-24 RX ORDER — LEVOTHYROXINE SODIUM 88 UG/1
TABLET ORAL
Qty: 30 TABLET | Refills: 3 | Status: SHIPPED | OUTPATIENT
Start: 2023-08-24

## 2023-09-09 DIAGNOSIS — F32.A DEPRESSION, UNSPECIFIED DEPRESSION TYPE: ICD-10-CM

## 2023-09-09 RX ORDER — BUPROPION HYDROCHLORIDE 300 MG/1
300 TABLET ORAL EVERY MORNING
Qty: 90 TABLET | Refills: 0 | Status: SHIPPED | OUTPATIENT
Start: 2023-09-09

## 2023-09-14 DIAGNOSIS — F41.9 ANXIETY: ICD-10-CM

## 2023-09-14 RX ORDER — HYDROXYZINE 50 MG/1
50 TABLET, FILM COATED ORAL
Qty: 90 TABLET | Refills: 1 | Status: SHIPPED | OUTPATIENT
Start: 2023-09-14

## 2023-09-16 DIAGNOSIS — J45.40 MODERATE PERSISTENT ASTHMA WITHOUT COMPLICATION: ICD-10-CM

## 2023-09-18 RX ORDER — BUDESONIDE AND FORMOTEROL FUMARATE DIHYDRATE 160; 4.5 UG/1; UG/1
2 AEROSOL RESPIRATORY (INHALATION) 2 TIMES DAILY
Qty: 10.2 G | Refills: 0 | Status: SHIPPED | OUTPATIENT
Start: 2023-09-18

## 2023-10-07 ENCOUNTER — APPOINTMENT (OUTPATIENT)
Dept: LAB | Facility: CLINIC | Age: 23
End: 2023-10-07
Payer: COMMERCIAL

## 2023-10-07 DIAGNOSIS — E03.9 ACQUIRED HYPOTHYROIDISM: ICD-10-CM

## 2023-10-07 LAB
T4 FREE SERPL-MCNC: 0.83 NG/DL (ref 0.61–1.12)
TSH SERPL DL<=0.05 MIU/L-ACNC: 0.74 UIU/ML (ref 0.45–4.5)

## 2023-10-07 PROCEDURE — 84439 ASSAY OF FREE THYROXINE: CPT

## 2023-10-07 PROCEDURE — 84443 ASSAY THYROID STIM HORMONE: CPT

## 2023-10-07 PROCEDURE — 36415 COLL VENOUS BLD VENIPUNCTURE: CPT

## 2023-10-08 DIAGNOSIS — E03.9 ACQUIRED HYPOTHYROIDISM: ICD-10-CM

## 2023-10-09 DIAGNOSIS — E03.9 ACQUIRED HYPOTHYROIDISM: Primary | ICD-10-CM

## 2023-10-09 RX ORDER — LEVOTHYROXINE SODIUM 88 MCG
TABLET ORAL
Qty: 90 TABLET | Refills: 2 | Status: SHIPPED | OUTPATIENT
Start: 2023-10-09

## 2023-10-19 DIAGNOSIS — J45.40 MODERATE PERSISTENT ASTHMA WITHOUT COMPLICATION: ICD-10-CM

## 2023-10-19 RX ORDER — BUDESONIDE AND FORMOTEROL FUMARATE DIHYDRATE 160; 4.5 UG/1; UG/1
2 AEROSOL RESPIRATORY (INHALATION) 2 TIMES DAILY
Qty: 10.2 G | Refills: 0 | Status: SHIPPED | OUTPATIENT
Start: 2023-10-19

## 2023-11-20 DIAGNOSIS — J45.40 MODERATE PERSISTENT ASTHMA WITHOUT COMPLICATION: ICD-10-CM

## 2023-11-20 RX ORDER — BUDESONIDE AND FORMOTEROL FUMARATE DIHYDRATE 160; 4.5 UG/1; UG/1
2 AEROSOL RESPIRATORY (INHALATION) 2 TIMES DAILY
Qty: 10.2 G | Refills: 1 | Status: SHIPPED | OUTPATIENT
Start: 2023-11-20

## 2024-01-03 ENCOUNTER — APPOINTMENT (OUTPATIENT)
Dept: LAB | Facility: CLINIC | Age: 24
End: 2024-01-03
Payer: COMMERCIAL

## 2024-01-03 DIAGNOSIS — E03.9 ACQUIRED HYPOTHYROIDISM: ICD-10-CM

## 2024-01-03 LAB
T4 FREE SERPL-MCNC: 1.03 NG/DL (ref 0.61–1.12)
TSH SERPL DL<=0.05 MIU/L-ACNC: 1.8 UIU/ML (ref 0.45–4.5)

## 2024-01-03 PROCEDURE — 84439 ASSAY OF FREE THYROXINE: CPT

## 2024-01-03 PROCEDURE — 84443 ASSAY THYROID STIM HORMONE: CPT

## 2024-01-03 PROCEDURE — 36415 COLL VENOUS BLD VENIPUNCTURE: CPT

## 2024-01-05 ENCOUNTER — TELEPHONE (OUTPATIENT)
Dept: ENDOCRINOLOGY | Facility: CLINIC | Age: 24
End: 2024-01-05

## 2024-01-08 ENCOUNTER — CLINICAL SUPPORT (OUTPATIENT)
Dept: FAMILY MEDICINE CLINIC | Facility: CLINIC | Age: 24
End: 2024-01-08
Payer: COMMERCIAL

## 2024-01-08 ENCOUNTER — OFFICE VISIT (OUTPATIENT)
Dept: ENDOCRINOLOGY | Facility: CLINIC | Age: 24
End: 2024-01-08
Payer: COMMERCIAL

## 2024-01-08 VITALS
OXYGEN SATURATION: 99 % | BODY MASS INDEX: 31.52 KG/M2 | DIASTOLIC BLOOD PRESSURE: 82 MMHG | HEART RATE: 111 BPM | HEIGHT: 69 IN | SYSTOLIC BLOOD PRESSURE: 136 MMHG | WEIGHT: 212.8 LBS

## 2024-01-08 DIAGNOSIS — E03.9 ACQUIRED HYPOTHYROIDISM: Primary | ICD-10-CM

## 2024-01-08 DIAGNOSIS — Z23 ENCOUNTER FOR IMMUNIZATION: Primary | ICD-10-CM

## 2024-01-08 PROCEDURE — G0010 ADMIN HEPATITIS B VACCINE: HCPCS

## 2024-01-08 PROCEDURE — 99214 OFFICE O/P EST MOD 30 MIN: CPT | Performed by: INTERNAL MEDICINE

## 2024-01-08 PROCEDURE — 90746 HEPB VACCINE 3 DOSE ADULT IM: CPT

## 2024-01-08 RX ORDER — LEVOTHYROXINE SODIUM 88 UG/1
CAPSULE ORAL
Qty: 90 CAPSULE | Refills: 0 | Status: SHIPPED | OUTPATIENT
Start: 2024-01-08

## 2024-01-08 NOTE — PROGRESS NOTES
1/8/2024    Assessment/Plan      Diagnoses and all orders for this visit:    Acquired hypothyroidism  -     Levothyroxine Sodium (Tirosint) 88 MCG CAPS; 1 capsule daily. TIROSINT DAILY  -     TSH, 3rd generation; Future  -     T4, free; Future        Assessment/Plan:  Hypothyroidism: Overall doing well on current regimen.  We discussed considering trial of brand specific form such as Tirosint at the same dose of 88 mcg daily.  She is agreeable to try this and we will repeat labs in about 6-8 weeks.  Will be in touch patient as results are available.  Arrange follow-up over the summer.      CC: Follow-up    History of Present Illness     HPI: Sujata Garvin is a 23 y.o. year old female who presents for a follow-up appointment.  I initially saw her in the office in August 2023 for lower thyroid levels.  Additional evaluation including IGF-I, 24-hour urine free cortisol returned normal.  She does continue on a combined oral contraceptive pill as well as spironolactone.  Spironolactone is for acne.  For hypothyroidism she is treated with Synthroid brand specific 88 mcg daily.  She presents today for a follow-up visit noting overall feeling better on current regimen but still notes some fatigue remains and still has difficulty and challenges with weight loss.    Review of Systems   Constitutional:  Negative for fatigue.   HENT:  Negative for trouble swallowing and voice change.    Eyes:  Negative for visual disturbance.   Respiratory:  Negative for shortness of breath.    Cardiovascular:  Negative for palpitations and leg swelling.   Gastrointestinal:  Negative for abdominal pain, nausea and vomiting.   Endocrine: Negative for polydipsia and polyuria.   Musculoskeletal:  Negative for arthralgias and myalgias.   Skin:  Negative for rash.   Neurological:  Negative for dizziness, tremors and weakness.   Hematological:  Negative for adenopathy.   Psychiatric/Behavioral:  Negative for agitation and confusion.         Historical Information   Past Medical History:   Diagnosis Date    Anxiety     Asthma     Depression     Hypothyroid     Migraines     Pneumonia due to COVID-19 virus 09/19/2022    seen in [place] urgent care. CXR reviewed and showed no focal consolidation. was put on paxlovid. see scanned progress note/xray report     No past surgical history on file.  Social History   Social History     Substance and Sexual Activity   Alcohol Use Never    Comment: Does not drink alcohol - As per [employer]      Social History     Substance and Sexual Activity   Drug Use Never    Comment: No - As per [employer]      Social History     Tobacco Use   Smoking Status Never   Smokeless Tobacco Never     Family History:   Family History   Problem Relation Age of Onset    Hypertension Mother     Completed Suicide  Father     Hypertension Maternal Grandmother     Heart disease Maternal Grandfather     Hypertension Maternal Grandfather     Diabetes Paternal Grandfather        Meds/Allergies   Current Outpatient Medications   Medication Sig Dispense Refill    Levothyroxine Sodium (Tirosint) 88 MCG CAPS 1 capsule daily. TIROSINT DAILY 90 capsule 0    albuterol (PROVENTIL HFA,VENTOLIN HFA) 90 mcg/act inhaler       buPROPion (WELLBUTRIN XL) 300 mg 24 hr tablet TAKE 1 TABLET (300 MG TOTAL) BY MOUTH EVERY MORNING. 90 tablet 0    cetirizine (ZyrTEC) 10 mg tablet Take 1 tablet by mouth daily      EPINEPHrine (EPIPEN) 0.3 mg/0.3 mL SOAJ as needed PRN      fluticasone (FLONASE) 50 mcg/act nasal spray 1 spray into each nostril daily as needed      hydrOXYzine HCL (ATARAX) 50 mg tablet TAKE 1 TABLET BY MOUTH DAILY AT BEDTIME 90 tablet 1    norethindrone-ethinyl estradiol (MICROGESTIN 1/20) 1-20 MG-MCG per tablet Take 1 tablet by mouth daily      Nurtec 75 MG TBDP Take 1 tablet by mouth if needed      Spacer/Aero-Holding Chambers (AeroChamber Plus Robe-Vu) MISC Take 1 Device by mouth 2 (two) times a day      spironolactone (ALDACTONE) 50  "mg tablet Take 1 tablet by mouth daily      Symbicort 160-4.5 MCG/ACT inhaler TAKE 2 PUFFS BY MOUTH TWICE A DAY 10.2 g 1    topiramate (TOPAMAX) 50 MG tablet Take 50 mg by mouth 2 (two) times a day      tretinoin (REFISSA) 0.05 % cream Apply 1 application. topically if needed      triamcinolone (KENALOG) 0.5 % cream Apply topically 3 (three) times a day 30 g 0     No current facility-administered medications for this visit.     Allergies   Allergen Reactions    Peanut (Diagnostic) - Food Allergy Anaphylaxis       Objective   Vitals: Blood pressure 136/82, pulse (!) 111, height 5' 9\" (1.753 m), weight 96.5 kg (212 lb 12.8 oz), SpO2 99%, not currently breastfeeding.  Invasive Devices       Peripheral Intravenous Line  Duration             Peripheral IV 10/03/22 Left Antecubital 461 days                    Physical Exam  Vitals reviewed.   Constitutional:       General: She is not in acute distress.     Appearance: She is well-developed. She is not diaphoretic.   HENT:      Head: Normocephalic and atraumatic.   Eyes:      Conjunctiva/sclera: Conjunctivae normal.      Pupils: Pupils are equal, round, and reactive to light.   Neck:      Thyroid: No thyromegaly.   Cardiovascular:      Rate and Rhythm: Normal rate and regular rhythm.   Pulmonary:      Effort: Pulmonary effort is normal. No respiratory distress.      Breath sounds: Normal breath sounds.   Abdominal:      General: Bowel sounds are normal.      Palpations: Abdomen is soft.   Musculoskeletal:         General: Normal range of motion.      Cervical back: Normal range of motion and neck supple.   Skin:     General: Skin is warm and dry.      Findings: No rash.   Neurological:      Mental Status: She is alert and oriented to person, place, and time.      Motor: No abnormal muscle tone.   Psychiatric:         Behavior: Behavior normal.         The history was obtained from the review of the chart and from the patient.    Lab Results:      Component      Latest Ref " "Rng 8/10/2023 8/17/2023 10/7/2023 1/3/2024   TESTOSTERONE FREE      0.0 - 4.2 pg/mL 2.8       Testosterone, Total, LC/MS      13 - 71 ng/dL 24       CORTISOL,F,UG/L,U      Undefined ug/L  6      CORTISOL 24H URINARY FREE      6 - 42 ug/24 hr  15      DHEA-SO4      110.0 - 431.7 ug/dL 189.0       TSH 3RD GENERATON      0.450 - 4.500 uIU/mL 4.995 (H)   0.736  1.800    Free T4      0.61 - 1.12 ng/dL 0.89   0.83  1.03    17-OH PROGESTERONE      ng/dL 19       PROLACTIN      3.34 - 26.72 ng/mL 22.99       INSULIN-LIKE GROWTH FACTOR-1      101 - 347 ng/mL 163          Legend:  (H) High      Future Appointments   Date Time Provider Department Center   1/8/2024  3:15 PM [facility]  [facility]       Portions of the record may have been created with voice recognition software. Occasional wrong word or \"sound a like\" substitutions may have occurred due to the inherent limitations of voice recognition software. Read the chart carefully and recognize, using context, where substitutions have occurred.    "

## 2024-01-10 ENCOUNTER — APPOINTMENT (OUTPATIENT)
Dept: RADIOLOGY | Facility: CLINIC | Age: 24
End: 2024-01-10
Payer: COMMERCIAL

## 2024-01-10 ENCOUNTER — OFFICE VISIT (OUTPATIENT)
Dept: URGENT CARE | Facility: CLINIC | Age: 24
End: 2024-01-10
Payer: COMMERCIAL

## 2024-01-10 VITALS
WEIGHT: 210.6 LBS | SYSTOLIC BLOOD PRESSURE: 130 MMHG | BODY MASS INDEX: 33.85 KG/M2 | OXYGEN SATURATION: 99 % | RESPIRATION RATE: 18 BRPM | TEMPERATURE: 98.7 F | DIASTOLIC BLOOD PRESSURE: 90 MMHG | HEIGHT: 66 IN | HEART RATE: 91 BPM

## 2024-01-10 DIAGNOSIS — M25.531 RIGHT WRIST PAIN: ICD-10-CM

## 2024-01-10 DIAGNOSIS — S52.501A CLOSED FRACTURE OF DISTAL END OF RIGHT RADIUS, UNSPECIFIED FRACTURE MORPHOLOGY, INITIAL ENCOUNTER: Primary | ICD-10-CM

## 2024-01-10 PROCEDURE — 73110 X-RAY EXAM OF WRIST: CPT

## 2024-01-10 PROCEDURE — 99213 OFFICE O/P EST LOW 20 MIN: CPT | Performed by: NURSE PRACTITIONER

## 2024-01-11 NOTE — PROGRESS NOTES
[facility]        NAME: Sujata Garvin is a 23 y.o. female  : 2000    MRN: [id number]  DATE: January 10, 2024  TIME: 7:12 PM    Assessment and Plan   Closed fracture of distal end of right radius, unspecified fracture morphology, initial encounter [S52.501A]  1. Closed fracture of distal end of right radius, unspecified fracture morphology, initial encounter  Ambulatory Referral to Orthopedic Surgery      2. Right wrist pain  XR wrist 3+ vw right    Ambulatory Referral to Orthopedic Surgery        Acute symptomatic wet read x-ray positive for distal radial fracture will heed radiology read.  Patient was already wearing a splint when she arrived.  Educated ice elevate as tolerates over-the-counter Motrin as needed will place referral to orthopedics provided number to schedule.    Patient Instructions       Follow up with PCP in 3-5 days.  Proceed to  ER if symptoms worsen.    Chief Complaint     Chief Complaint   Patient presents with   • Wrist Pain     Patient complains of wrist pain. She states she fell off a chair last night and caught her self with her hand outstretched. Has taken ibuprofen to help with pain.         History of Present Illness       Patient is a 23-year-old female arrives with mother with complaints of right wrist pain.  Patient reports she fell off a chair last night caught herself without reached hand and fell on her buttock.  Has been taking ibuprofen with some relief.  Did have a wrist brace on at arrival.  Does have notable swelling pain with range of motion and bruising.        Review of Systems   Review of Systems   Constitutional:  Negative for activity change, appetite change, chills, fatigue and fever.   HENT:  Negative for congestion, postnasal drip, rhinorrhea, sneezing and sore throat.    Respiratory:  Negative for cough, chest tightness, shortness of breath and wheezing.    Cardiovascular:  Negative for chest pain and palpitations.    Gastrointestinal:  Negative for abdominal pain, constipation, diarrhea, nausea and vomiting.   Musculoskeletal:  Positive for arthralgias, joint swelling and myalgias.   Skin:  Negative for color change, pallor and rash.   Neurological:  Negative for dizziness, weakness, light-headedness and headaches.   Hematological:  Negative for adenopathy.   Psychiatric/Behavioral:  Negative for agitation and confusion.          Current Medications       Current Outpatient Medications:   •  albuterol (PROVENTIL HFA,VENTOLIN HFA) 90 mcg/act inhaler, , Disp: , Rfl:   •  buPROPion (WELLBUTRIN XL) 300 mg 24 hr tablet, TAKE 1 TABLET (300 MG TOTAL) BY MOUTH EVERY MORNING., Disp: 90 tablet, Rfl: 0  •  cetirizine (ZyrTEC) 10 mg tablet, Take 1 tablet by mouth daily, Disp: , Rfl:   •  EPINEPHrine (EPIPEN) 0.3 mg/0.3 mL SOAJ, as needed PRN, Disp: , Rfl:   •  fluticasone (FLONASE) 50 mcg/act nasal spray, 1 spray into each nostril daily as needed, Disp: , Rfl:   •  hydrOXYzine HCL (ATARAX) 50 mg tablet, TAKE 1 TABLET BY MOUTH DAILY AT BEDTIME, Disp: 90 tablet, Rfl: 1  •  Levothyroxine Sodium (Tirosint) 88 MCG CAPS, 1 capsule daily. TIROSINT DAILY, Disp: 90 capsule, Rfl: 0  •  norethindrone-ethinyl estradiol (MICROGESTIN 1/20) 1-20 MG-MCG per tablet, Take 1 tablet by mouth daily, Disp: , Rfl:   •  Nurtec 75 MG TBDP, Take 1 tablet by mouth if needed, Disp: , Rfl:   •  Spacer/Aero-Holding Chambers (AeroChamber Plus Robe-Vu) MISC, Take 1 Device by mouth 2 (two) times a day, Disp: , Rfl:   •  spironolactone (ALDACTONE) 50 mg tablet, Take 1 tablet by mouth daily, Disp: , Rfl:   •  Symbicort 160-4.5 MCG/ACT inhaler, TAKE 2 PUFFS BY MOUTH TWICE A DAY, Disp: 10.2 g, Rfl: 1  •  topiramate (TOPAMAX) 50 MG tablet, Take 50 mg by mouth 2 (two) times a day, Disp: , Rfl:   •  tretinoin (REFISSA) 0.05 % cream, Apply 1 application. topically if needed, Disp: , Rfl:   •  triamcinolone (KENALOG) 0.5 % cream, Apply topically 3 (three) times a day, Disp: 30 g,  "Rfl: 0    Current Allergies     Allergies as of 01/10/2024 - Reviewed 01/10/2024   Allergen Reaction Noted   • Peanut (diagnostic) - food allergy Anaphylaxis 08/26/2021            The following portions of the patient's history were reviewed and updated as appropriate: allergies, current medications, past family history, past medical history, past social history, past surgical history and problem list.     Past Medical History:   Diagnosis Date   • Anxiety    • Asthma    • Depression    • Hypothyroid    • Migraines    • Pneumonia due to COVID-19 virus 09/19/2022    seen in [place] urgent care. CXR reviewed and showed no focal consolidation. was put on paxlovid. see scanned progress note/xray report       History reviewed. No pertinent surgical history.    Family History   Problem Relation Age of Onset   • Hypertension Mother    • Completed Suicide  Father    • Hypertension Maternal Grandmother    • Heart disease Maternal Grandfather    • Hypertension Maternal Grandfather    • Diabetes Paternal Grandfather          Medications have been verified.        Objective   /90   Pulse 91   Temp 98.7 °F (37.1 °C)   Resp 18   Ht 5' 6\" (1.676 m)   Wt 95.5 kg (210 lb 9.6 oz)   SpO2 99%   BMI 33.99 kg/m²   No LMP recorded.       Physical Exam     Physical Exam  Vitals and nursing note reviewed.   Constitutional:       General: She is not in acute distress.     Appearance: Normal appearance. She is not ill-appearing or diaphoretic.   HENT:      Head: Normocephalic and atraumatic.      Nose: No congestion or rhinorrhea.   Eyes:      General: No scleral icterus.        Right eye: No discharge.         Left eye: No discharge.   Pulmonary:      Effort: Pulmonary effort is normal. No respiratory distress.   Musculoskeletal:         General: Swelling, tenderness and signs of injury present.      Right wrist: Swelling, tenderness and bony tenderness present. Decreased range of motion. Normal pulse.      Cervical back: " Normal range of motion.   Skin:     Coloration: Skin is not jaundiced or pale.      Findings: No bruising, erythema or rash.   Neurological:      General: No focal deficit present.      Mental Status: She is alert and oriented to person, place, and time.   Psychiatric:         Mood and Affect: Mood normal.         Behavior: Behavior normal.         Thought Content: Thought content normal.         Judgment: Judgment normal.

## 2024-01-15 ENCOUNTER — TELEPHONE (OUTPATIENT)
Age: 24
End: 2024-01-15

## 2024-01-15 NOTE — TELEPHONE ENCOUNTER
Patient is being referred to a orthopedics. Please schedule accordingly.    [facility]   [phone]

## 2024-02-06 DIAGNOSIS — J45.40 MODERATE PERSISTENT ASTHMA WITHOUT COMPLICATION: ICD-10-CM

## 2024-02-06 RX ORDER — BUDESONIDE AND FORMOTEROL FUMARATE DIHYDRATE 160; 4.5 UG/1; UG/1
2 AEROSOL RESPIRATORY (INHALATION) 2 TIMES DAILY
Qty: 10.2 G | Refills: 1 | Status: SHIPPED | OUTPATIENT
Start: 2024-02-06
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
## 2024-02-29 NOTE — PROGRESS NOTES
Virtual Regular Visit    Verification of patient location:    Patient is located in the following state in which I hold an active license [place]      Assessment/Plan:    Problem List Items Addressed This Visit        Endocrine    Acquired hypothyroidism - Primary    Relevant Medications    levothyroxine (Synthroid) 25 mcg tablet    Other Relevant Orders    TSH, 3rd generation with Free T4 reflex    tSH elevated on labs done yesterday  Free T4 normal  Only symptoms are dry skin and weight gain  Unclear if weight gain is from thyroid or from her abilify  Will prescribe synthrodi 25 mcg once daily   Recheck tsh 6-8 weeks  She was advised to take the synthroid in AM on empty stomach with no other meds or vitamins       Other    Depression  Stable on current meds  I refilled her bupropion and vistaril yesterday  She is no longer taking the cymbalta and abilify, stating that she d/c'd them earlier this summer as the were "not doing anything"  Her PHQ 9 and KOKI 7 scores today were 3 and 8 respectively  She is trying to find a new psychiatrist to manage her meds  Anxiety    Elevated liver enzymes  Slight elevation of her ALT on recent labs  We discussed various etiologies of this  Encouraged weight loss, avoidance of alcohol, otc meds such as advil, tylenol  Will monitor this  Reason for visit is   Chief Complaint   Patient presents with    Follow-up     Pt has a VV to discuss her lab results (abnormal TSH)  Last labs completed 08/17/2022  [facility]    Virtual Regular Visit        Encounter provider Javier Girard DO    Provider located at [address] [id number]      Recent Visits  No visits were found meeting these conditions    Showing recent visits within past 7 days and meeting all other requirements  Today's Visits  Date Type Provider Dept   08/18/22 Telemedicine Javier Girard DO [facility]   Showing today's visits and meeting all other requirements  Future Appointments  No visits were found meeting these conditions  Showing future appointments within next 150 days and meeting all other requirements       The patient was identified by name and date of birth  Cristobal Cabot was informed that this is a telemedicine visit and that the visit is being conducted through [facility] and patient was informed this is a secure, HIPAA-complaint platform  She agrees to proceed     My office door was closed  No one else was in the room  She acknowledged consent and understanding of privacy and security of the video platform  The patient has agreed to participate and understands they can discontinue the visit at any time  Patient is aware this is a billable service  Subjective  Cristobal Cabot is a 22 y o  female with depression, anxiety, migraine, asthma and allergies being seen today via virtual encounter to review labwork that she had completed yesterday for screening purposes  Her TSH was elevated at :  Lab Results   Component Value Date    TSH 7 350 (H) 08/17/2022     Free T4 was normal at 1 07  Has eczema so always has dry skin  Slight weight gain over the summer (10 lbs)    Other labs reviewed and were within normal limits with the exception of her ALT which was slightly elevated at 44  AST, alk phos and bilirubin were normal      Her mother had phoned office yesterday inquiring as to whether I would refill her medications previously prescribed by her psychiatrist in [place] [address] until she was able to find a new psychiatrist      She is currently taking bupropion  mg daily along with atarax 50 mg at hs  She had also been prescribed cymbalta 60 mg and abilify 5 mg but reports that she stopped taking both of those medications earlier this summer because she did not feel that they were doing anything   States that those meds were added to her regimen more recently  She states that she feels the same since d/c'ing these meds  Moods are okay  PHQ-2/9 Depression Screening    Little interest or pleasure in doing things: 0 - not at all  Feeling down, depressed, or hopeless: 1 - several days  Trouble falling or staying asleep, or sleeping too much: 0 - not at all  Feeling tired or having little energy: 2 - more than half the days  Poor appetite or overeatin - not at all  Feeling bad about yourself - or that you are a failure or have let yourself or your family down: 0 - not at all  Trouble concentrating on things, such as reading the newspaper or watching television: 0 - not at all  Moving or speaking so slowly that other people could have noticed  Or the opposite - being so fidgety or restless that you have been moving around a lot more than usual: 0 - not at all  Thoughts that you would be better off dead, or of hurting yourself in some way: 0 - not at all  PHQ-9 Score: 3   PHQ-9 Interpretation: No or Minimal depression        KOKI-7 Flowsheet Screening    Flowsheet Row Most Recent Value   Over the last 2 weeks, how often have you been bothered by any of the following problems? 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  HPI     Past Medical History:   Diagnosis Date    Anxiety     Asthma     Depression     Hypothyroid     Migraines        History reviewed  No pertinent surgical history      Current Outpatient Medications   Medication Sig Dispense Refill    albuterol (PROVENTIL HFA,VENTOLIN HFA) 90 mcg/act inhaler       buPROPion (WELLBUTRIN XL) 300 mg 24 hr tablet Take 1 tablet (300 mg total) by mouth every morning 30 tablet 0    cetirizine (ZyrTEC) 10 mg tablet Take 1 tablet by mouth daily      EPINEPHrine (EPIPEN) 0 3 mg/0 3 mL SOAJ as needed      hydrOXYzine HCL (ATARAX) 50 mg tablet Take 1 tablet (50 mg total) by mouth daily at bedtime 30 tablet 0    Isaak 24 FE 1-20 MG-MCG(24) per tablet Take 1 tablet by mouth daily 84 tablet 0    levothyroxine (Synthroid) 25 mcg tablet Take 1 tablet (25 mcg total) by mouth daily 90 tablet 1    Spacer/Aero-Holding Chambers (AeroChamber Plus Robe-Vu) MISC Take 1 Device by mouth 2 (two) times a day      Symbicort 160-4 5 MCG/ACT inhaler       topiramate (TOPAMAX) 50 MG tablet Take 50 mg by mouth 2 (two) times a day      triamcinolone (KENALOG) 0 5 % cream Apply topically 3 (three) times a day 30 g 0     No current facility-administered medications for this visit  Allergies   Allergen Reactions    Peanut (Diagnostic) - Food Allergy Anaphylaxis       Review of Systems   Constitutional: Positive for unexpected weight change  Negative for activity change, appetite change and fatigue  Respiratory: Negative for shortness of breath  Gastrointestinal: Negative for constipation  Endocrine: Negative for cold intolerance and heat intolerance  Genitourinary: Negative for menstrual problem  Skin:        +dry skin   Psychiatric/Behavioral: Negative for agitation, decreased concentration and dysphoric mood  The patient is not nervous/anxious  Video Exam    Vitals:    08/18/22 1402   Weight: 77 6 kg (171 lb)   Height: 5' 5 5" (1 664 m)       Physical Exam  Nursing note reviewed  Constitutional:       Appearance: Normal appearance  HENT:      Head: Normocephalic and atraumatic  Eyes:      Conjunctiva/sclera: Conjunctivae normal    Pulmonary:      Effort: Pulmonary effort is normal  No respiratory distress  Neurological:      Mental Status: She is alert     Psychiatric:         Mood and Affect: Mood normal           I spent 15 minutes directly with the patient during this visit with patient

## 2024-03-10 DIAGNOSIS — F41.9 ANXIETY: ICD-10-CM

## 2024-03-11 RX ORDER — HYDROXYZINE 50 MG/1
50 TABLET, FILM COATED ORAL
Qty: 90 TABLET | Refills: 1 | Status: SHIPPED | OUTPATIENT
Start: 2024-03-11

## 2024-03-29 ENCOUNTER — APPOINTMENT (OUTPATIENT)
Dept: LAB | Facility: CLINIC | Age: 24
End: 2024-03-29
Payer: COMMERCIAL

## 2024-03-29 DIAGNOSIS — E03.9 ACQUIRED HYPOTHYROIDISM: ICD-10-CM

## 2024-03-29 LAB
T4 FREE SERPL-MCNC: 0.94 NG/DL (ref 0.61–1.12)
TSH SERPL DL<=0.05 MIU/L-ACNC: 0.52 UIU/ML (ref 0.45–4.5)

## 2024-03-29 PROCEDURE — 84443 ASSAY THYROID STIM HORMONE: CPT

## 2024-03-29 PROCEDURE — 36415 COLL VENOUS BLD VENIPUNCTURE: CPT

## 2024-03-29 PROCEDURE — 84439 ASSAY OF FREE THYROXINE: CPT

## 2024-04-01 DIAGNOSIS — E03.9 ACQUIRED HYPOTHYROIDISM: Primary | ICD-10-CM

## 2024-04-11 DIAGNOSIS — J45.40 MODERATE PERSISTENT ASTHMA WITHOUT COMPLICATION: ICD-10-CM

## 2024-04-11 RX ORDER — BUDESONIDE AND FORMOTEROL FUMARATE DIHYDRATE 160; 4.5 UG/1; UG/1
2 AEROSOL RESPIRATORY (INHALATION) 2 TIMES DAILY
Qty: 10.2 G | Refills: 1 | Status: SHIPPED | OUTPATIENT
Start: 2024-04-11

## 2024-04-23 DIAGNOSIS — E03.9 ACQUIRED HYPOTHYROIDISM: ICD-10-CM

## 2024-04-24 RX ORDER — LEVOTHYROXINE SODIUM 88 UG/1
CAPSULE ORAL
Qty: 90 CAPSULE | Refills: 1 | Status: SHIPPED | OUTPATIENT
Start: 2024-04-24

## 2024-07-08 DIAGNOSIS — E03.9 ACQUIRED HYPOTHYROIDISM: ICD-10-CM

## 2024-07-08 RX ORDER — LEVOTHYROXINE SODIUM 88 UG/1
CAPSULE ORAL
Qty: 90 CAPSULE | Refills: 1 | Status: SHIPPED | OUTPATIENT
Start: 2024-07-08

## 2024-07-10 DIAGNOSIS — J45.40 MODERATE PERSISTENT ASTHMA WITHOUT COMPLICATION: ICD-10-CM

## 2024-07-10 DIAGNOSIS — Z91.010 PEANUT ALLERGY: Primary | ICD-10-CM

## 2024-07-10 RX ORDER — BUDESONIDE AND FORMOTEROL FUMARATE DIHYDRATE 160; 4.5 UG/1; UG/1
2 AEROSOL RESPIRATORY (INHALATION) 2 TIMES DAILY
Qty: 10.2 G | Refills: 0 | Status: SHIPPED | OUTPATIENT
Start: 2024-07-10 | End: 2024-07-10

## 2024-07-10 RX ORDER — BUDESONIDE AND FORMOTEROL FUMARATE DIHYDRATE 160; 4.5 UG/1; UG/1
2 AEROSOL RESPIRATORY (INHALATION) 2 TIMES DAILY
Qty: 1 G | Refills: 0 | Status: SHIPPED | OUTPATIENT
Start: 2024-07-10

## 2024-07-10 RX ORDER — EPINEPHRINE 0.3 MG/.3ML
0.3 INJECTION SUBCUTANEOUS ONCE
Qty: 1 EACH | Refills: 0 | Status: SHIPPED | OUTPATIENT
Start: 2024-07-10 | End: 2024-07-10

## 2024-07-10 NOTE — TELEPHONE ENCOUNTER
Generic okay. Rx sent   Benefits, risks, and possible complications of procedure explained to patient/caregiver who verbalized understanding and gave verbal consent.

## 2024-07-11 ENCOUNTER — OFFICE VISIT (OUTPATIENT)
Dept: NEUROLOGY | Facility: CLINIC | Age: 24
End: 2024-07-11
Payer: COMMERCIAL

## 2024-07-11 VITALS
HEIGHT: 66 IN | BODY MASS INDEX: 33.14 KG/M2 | TEMPERATURE: 97.9 F | HEART RATE: 101 BPM | DIASTOLIC BLOOD PRESSURE: 101 MMHG | OXYGEN SATURATION: 99 % | WEIGHT: 206.2 LBS | SYSTOLIC BLOOD PRESSURE: 181 MMHG

## 2024-07-11 DIAGNOSIS — G43.709 CHRONIC MIGRAINE WITHOUT AURA: Primary | ICD-10-CM

## 2024-07-11 PROCEDURE — 99204 OFFICE O/P NEW MOD 45 MIN: CPT | Performed by: PSYCHIATRY & NEUROLOGY

## 2024-07-11 RX ORDER — TOPIRAMATE 50 MG/1
50 TABLET, FILM COATED ORAL 2 TIMES DAILY
Qty: 60 TABLET | Refills: 3 | Status: SHIPPED | OUTPATIENT
Start: 2024-07-11

## 2024-07-11 NOTE — PROGRESS NOTES
"Patient ID: Sujata Garvin is a 23 y.o. female.    Assessment/Plan:    24 y/o Female who is here as a new patient to establish care with us for chronic migraine with aura.    PLAN:      Problem List Items Addressed This Visit          Cardiovascular and Mediastinum    Chronic migraine without aura - Primary     Current  treatment plan -   Preventative medications:  continue with topamax 50mg PO BID  Failed medications in the past - none    Abortive medications: continue with nurtec as needed     Patient has noticed a slight uptick in her headaches, so will obtain Mri brain with and without contrast to look for structural issues.     Botox/CGRPs medications: not at this time     Acute treatment in the office: no headache today     Lifestyle Modifications:  1.Maintain headache diary.  We discussed an MAO for a smart phone is \"Migraine buddy\"  2. When patient has a moderate to severe headache, they should seek rest, initiate relaxation and apply cold compresses to the head.   3. Hydration - Please drink at least 64 ounces of water a day to help remain hydrated.  4. Sleep -  Maintain regular sleep schedule. Adults need at least 7-8 hours of uninterrupted a night. Maintain good sleep hygiene:Adults need at least 7-8 hours of uninterrupted a night.   5. Diet - Patient is to have regular frequent meals to prevent headache onset. Avoid dietary trigger. (aged cheese, peanuts, MSG, aspartame and nitrates).  6. Medications - Limit over the counter medications such as Tylenol, Ibuprofen, Aleve, Excedrin. (No more than 3 times a week).  7. Exercise - Daily exercise is not only good for your heart but also good for your mental health. Recommend 4-5 times a week for 20 minutes.             Relevant Medications    topiramate (TOPAMAX) 50 MG tablet    Other Relevant Orders    MRI brain with and without contrast      Follow up in 3-4 months     I would be happy to see the patient sooner if any new questions/concerns " arise.  Patient/Guardian was advised to the call the office if they have any questions and concerns in the meantime.     Patient/Guardian does understand that if they have any new stroke like symptoms such as facial droop on one side, weakness/paralysis on either side, speech trouble, numbness on one side, balance issues, any vision changes, extreme dizziness or any new headache, to call 9-1-1 immediately or to proceed to the nearest ER immediately.      Subjective:    HPI    Patient is a 80-year-old man with a past significant history of migraine headaches -diagnosed at 13 years old- comes in for headaches. He mentions the headaches has been occurring in the last 5 to 6 months, with no significant incident the patient relates that could be a cause of the headaches. As per the wife and daughter also in the room, patient has not been having any headaches before the 6-month period, as the migraine headaches were limited to the early years of the patient.  Patient mentions the headaches are associated with the following symptoms: Nausea, pulsing, achiness, stabbing pain, decreased appetite, pale face, drooling eyelids, having visual impairments such as “flickering in the eye” and “flashes in the eye”, and dizziness, with dizziness happening also at times he is headache-free. But when asked specifically if the headaches were the same as his childhood migraine headaches, he mentions that these headaches actually differ in terms of being more of a tension type that is localized to the forehead to the back of his head. Patient has had 15 days of headaches in the last 30 days, none of them severe, and takes Tylenol as soon as possible when he has these headaches.  Nearly half of them go away and other half continues approximately 2 to 3 hours. He mentions the headaches are triggered by anxiety and when he concentrates on a task, and the daughter also mentions memory problems, which the patient mentions also makes him more  frustrated and exacerbates the headaches. He also sees a psychiatrist and is in psychotherapy for the anxiety and mentions issues with sleep mostly in a form of nightmares.  It should also be noted that the patient had a difficult time remembering the specific details of the headaches  and his Avila cognitive assessment scored for 16/30 at the time of today's visit.    The following portions of the patient's history were reviewed and updated as appropriate: She  has a past medical history of Anxiety, Asthma, Depression, Hypothyroid, Migraines, and Pneumonia due to COVID-19 virus (09/19/2022).  She   Patient Active Problem List    Diagnosis Date Noted    Chronic migraine without aura 07/12/2024    Hypertension 05/30/2023    Acquired hypothyroidism 08/18/2022    Elevated liver enzymes 08/18/2022    Migraine with aura and without status migrainosus, not intractable 06/27/2022    Depression 06/27/2022    Anxiety 06/27/2022    Allergic rhinitis 06/27/2022    Moderate persistent asthma without complication 06/27/2022     She  has no past surgical history on file.  Her family history includes Completed Suicide  in her father; Diabetes in her paternal grandfather; Heart disease in her maternal grandfather; Hypertension in her maternal grandfather, maternal grandmother, and mother.  She  reports that she has never smoked. She has never used smokeless tobacco. She reports that she does not drink alcohol and does not use drugs.  Current Outpatient Medications   Medication Sig Dispense Refill    albuterol (PROVENTIL HFA,VENTOLIN HFA) 90 mcg/act inhaler       budesonide-formoterol (SYMBICORT) 160-4.5 mcg/act inhaler Inhale 2 puffs 2 (two) times a day 1 g 0    cetirizine (ZyrTEC) 10 mg tablet Take 1 tablet by mouth daily      fluticasone (FLONASE) 50 mcg/act nasal spray 1 spray into each nostril daily as needed      hydrOXYzine HCL (ATARAX) 50 mg tablet TAKE 1 TABLET BY MOUTH EVERYDAY AT BEDTIME 90 tablet 1    Levothyroxine  Sodium (Tirosint) 88 MCG CAPS 1 capsule daily. TIROSINT DAILY 90 capsule 1    Nurtec 75 MG TBDP Take 1 tablet by mouth if needed      topiramate (TOPAMAX) 50 MG tablet Take 1 tablet (50 mg total) by mouth 2 (two) times a day 60 tablet 3    triamcinolone (KENALOG) 0.5 % cream Apply topically 3 (three) times a day 30 g 0    buPROPion (WELLBUTRIN XL) 300 mg 24 hr tablet TAKE 1 TABLET (300 MG TOTAL) BY MOUTH EVERY MORNING. 90 tablet 0    EPINEPHrine (EPIPEN) 0.3 mg/0.3 mL SOAJ Inject 0.3 mL (0.3 mg total) into a muscle once for 1 dose PRN 1 each 0    norethindrone-ethinyl estradiol (MICROGESTIN 1/20) 1-20 MG-MCG per tablet Take 1 tablet by mouth daily      Spacer/Aero-Holding Chambers (AeroChamber Plus Robe-Vu) MISC Take 1 Device by mouth 2 (two) times a day      spironolactone (ALDACTONE) 50 mg tablet Take 1 tablet by mouth daily      tretinoin (REFISSA) 0.05 % cream Apply 1 application. topically if needed       No current facility-administered medications for this visit.     Current Outpatient Medications on File Prior to Visit   Medication Sig    albuterol (PROVENTIL HFA,VENTOLIN HFA) 90 mcg/act inhaler     budesonide-formoterol (SYMBICORT) 160-4.5 mcg/act inhaler Inhale 2 puffs 2 (two) times a day    cetirizine (ZyrTEC) 10 mg tablet Take 1 tablet by mouth daily    fluticasone (FLONASE) 50 mcg/act nasal spray 1 spray into each nostril daily as needed    hydrOXYzine HCL (ATARAX) 50 mg tablet TAKE 1 TABLET BY MOUTH EVERYDAY AT BEDTIME    Levothyroxine Sodium (Tirosint) 88 MCG CAPS 1 capsule daily. TIROSINT DAILY    Nurtec 75 MG TBDP Take 1 tablet by mouth if needed    triamcinolone (KENALOG) 0.5 % cream Apply topically 3 (three) times a day    buPROPion (WELLBUTRIN XL) 300 mg 24 hr tablet TAKE 1 TABLET (300 MG TOTAL) BY MOUTH EVERY MORNING.    EPINEPHrine (EPIPEN) 0.3 mg/0.3 mL SOAJ Inject 0.3 mL (0.3 mg total) into a muscle once for 1 dose PRN    norethindrone-ethinyl estradiol (MICROGESTIN 1/20) 1-20 MG-MCG per  "tablet Take 1 tablet by mouth daily    Spacer/Aero-Holding Chambers (AeroChamber Plus Robe-Vu) MISC Take 1 Device by mouth 2 (two) times a day    spironolactone (ALDACTONE) 50 mg tablet Take 1 tablet by mouth daily    tretinoin (REFISSA) 0.05 % cream Apply 1 application. topically if needed     No current facility-administered medications on file prior to visit.     She is allergic to peanut (diagnostic) - food allergy..      Objective:    Blood pressure (!) 181/101, pulse 101, temperature 97.9 °F (36.6 °C), temperature source Temporal, height 5' 6\" (1.676 m), weight 93.5 kg (206 lb 3.2 oz), SpO2 99%, not currently breastfeeding.    Physical Exam  General - patient is alert   Speech - no dysarthria noted, no aphasia noted.     Neuro:   Cranial nerves: PERRL, EOMI, facial sensation intact to soft touch in V1, V2 and V3, no facial asymmetry noted, uvula/palate midline, tongue midline.   Motor: 5/5 throughout, normal tone, no pronator drift noted.   Sensory - intact to soft touch throughout  Reflexes - 2+ throughout  Coordination - no ataxia/dysmetria noted  Gait - normal      ROS:  Reviewed ROS   Review of Systems   Eyes:  Positive for photophobia (before and after) and visual disturbance (aura before migranes).   Gastrointestinal:  Positive for nausea and vomiting.   Neurological:  Positive for dizziness (after the episode) and headaches (periodical head aches 7-10 level pain ice pick).   Sensation disturbance               "

## 2024-07-12 ENCOUNTER — TELEPHONE (OUTPATIENT)
Dept: NEUROLOGY | Facility: CLINIC | Age: 24
End: 2024-07-12

## 2024-07-12 PROBLEM — G43.709 CHRONIC MIGRAINE WITHOUT AURA: Status: ACTIVE | Noted: 2024-07-12

## 2024-07-12 NOTE — ASSESSMENT & PLAN NOTE
"Current  treatment plan -   Preventative medications:  continue with topamax 50mg PO BID  Failed medications in the past - none    Abortive medications: continue with nurtec as needed     Patient has noticed a slight uptick in her headaches, so will obtain Mri brain with and without contrast to look for structural issues.     Botox/CGRPs medications: not at this time     Acute treatment in the office: no headache today     Lifestyle Modifications:  1.Maintain headache diary.  We discussed an MAO for a smart phone is \"Migraine buddy\"  2. When patient has a moderate to severe headache, they should seek rest, initiate relaxation and apply cold compresses to the head.   3. Hydration - Please drink at least 64 ounces of water a day to help remain hydrated.  4. Sleep -  Maintain regular sleep schedule. Adults need at least 7-8 hours of uninterrupted a night. Maintain good sleep hygiene:Adults need at least 7-8 hours of uninterrupted a night.   5. Diet - Patient is to have regular frequent meals to prevent headache onset. Avoid dietary trigger. (aged cheese, peanuts, MSG, aspartame and nitrates).  6. Medications - Limit over the counter medications such as Tylenol, Ibuprofen, Aleve, Excedrin. (No more than 3 times a week).  7. Exercise - Daily exercise is not only good for your heart but also good for your mental health. Recommend 4-5 times a week for 20 minutes.      "

## 2024-07-20 ENCOUNTER — LAB (OUTPATIENT)
Dept: LAB | Facility: CLINIC | Age: 24
End: 2024-07-20
Payer: COMMERCIAL

## 2024-07-20 DIAGNOSIS — E03.9 ACQUIRED HYPOTHYROIDISM: ICD-10-CM

## 2024-07-20 LAB
T4 FREE SERPL-MCNC: 1 NG/DL (ref 0.61–1.12)
TSH SERPL DL<=0.05 MIU/L-ACNC: 0.53 UIU/ML (ref 0.45–4.5)

## 2024-07-20 PROCEDURE — 36415 COLL VENOUS BLD VENIPUNCTURE: CPT

## 2024-07-20 PROCEDURE — 84443 ASSAY THYROID STIM HORMONE: CPT

## 2024-07-20 PROCEDURE — 84439 ASSAY OF FREE THYROXINE: CPT

## 2024-08-08 ENCOUNTER — OFFICE VISIT (OUTPATIENT)
Dept: ENDOCRINOLOGY | Facility: CLINIC | Age: 24
End: 2024-08-08
Payer: COMMERCIAL

## 2024-08-08 VITALS
SYSTOLIC BLOOD PRESSURE: 116 MMHG | DIASTOLIC BLOOD PRESSURE: 82 MMHG | OXYGEN SATURATION: 99 % | BODY MASS INDEX: 33.14 KG/M2 | HEIGHT: 66 IN | WEIGHT: 206.2 LBS | HEART RATE: 103 BPM

## 2024-08-08 DIAGNOSIS — E03.9 HYPOTHYROIDISM, UNSPECIFIED TYPE: Primary | ICD-10-CM

## 2024-08-08 PROCEDURE — 99214 OFFICE O/P EST MOD 30 MIN: CPT | Performed by: INTERNAL MEDICINE

## 2024-08-08 NOTE — PROGRESS NOTES
8/8/2024    Assessment & Plan      Diagnoses and all orders for this visit:    Hypothyroidism, unspecified type  -     TSH, 3rd generation; Future  -     T4, free; Future        Assessment/Plan:  1.  Hypothyroidism: Overall doing well on current regimen.  Reviewed the pros and cons of T4/T3 combination therapy.  For now we elected to continue current regimen and repeat labs in about 3 months.  Will arrange for follow-up in 6 months but we will be in contact with patient as results are available.      CC: Follow-up    History of Present Illness     HPI: Sujata Garvin is a 23 y.o. year old female with history of hypothyroidism who presents for a follow-up appointment.  Initially seen in our office in August 2023 with abnormal thyroid levels.  Additional evaluation including IGF-I, 24-hour urine free cortisol returned normal.  Previously was on Synthroid brand specific form of thyroid hormone and then at last visit we switched to Tirosint at a dose of 88 mcg daily.  She presents today overall feeling well.  She does not note much of a difference between being on Tirosint for Synthroid.  She is fine with continuing this for now.  Weight has been down since last appointment.  Still notes some persistent fatigue but overall feeling well.  She graduated [facility] [employer] recently and will be looking for jobs in the near future.    Review of Systems   Constitutional:  Negative for fatigue.   HENT:  Negative for trouble swallowing and voice change.    Eyes:  Negative for visual disturbance.   Respiratory:  Negative for shortness of breath.    Cardiovascular:  Negative for palpitations and leg swelling.   Gastrointestinal:  Negative for abdominal pain, nausea and vomiting.   Endocrine: Negative for polydipsia and polyuria.   Musculoskeletal:  Negative for arthralgias and myalgias.   Skin:  Negative for rash.   Neurological:  Negative for dizziness, tremors and weakness.   Hematological:  Negative for adenopathy.    Psychiatric/Behavioral:  Negative for agitation and confusion.        Historical Information   Past Medical History:   Diagnosis Date    Anxiety     Asthma     Depression     Hypothyroid     Migraines     Pneumonia due to COVID-19 virus 09/19/2022    seen in [place] urgent care. CXR reviewed and showed no focal consolidation. was put on paxlovid. see scanned progress note/xray report     History reviewed. No pertinent surgical history.  Social History   Social History     Substance and Sexual Activity   Alcohol Use Never    Comment: Does not drink alcohol - As per [employer]      Social History     Substance and Sexual Activity   Drug Use Never    Comment: No - As per [employer]      Social History     Tobacco Use   Smoking Status Never   Smokeless Tobacco Never     Family History:   Family History   Problem Relation Age of Onset    Hypertension Mother     Completed Suicide  Father     Hypertension Maternal Grandmother     Heart disease Maternal Grandfather     Hypertension Maternal Grandfather     Diabetes Paternal Grandfather        Meds/Allergies   Current Outpatient Medications   Medication Sig Dispense Refill    albuterol (PROVENTIL HFA,VENTOLIN HFA) 90 mcg/act inhaler       budesonide-formoterol (SYMBICORT) 160-4.5 mcg/act inhaler Inhale 2 puffs 2 (two) times a day 1 g 0    cetirizine (ZyrTEC) 10 mg tablet Take 1 tablet by mouth daily      EPINEPHrine (EPIPEN) 0.3 mg/0.3 mL SOAJ Inject 0.3 mL (0.3 mg total) into a muscle once for 1 dose PRN 1 each 0    fluticasone (FLONASE) 50 mcg/act nasal spray 1 spray into each nostril daily as needed      hydrOXYzine HCL (ATARAX) 50 mg tablet TAKE 1 TABLET BY MOUTH EVERYDAY AT BEDTIME 90 tablet 1    Levothyroxine Sodium (Tirosint) 88 MCG CAPS 1 capsule daily. TIROSINT DAILY 90 capsule 1    Nurtec 75 MG TBDP Take 1 tablet by mouth if needed      topiramate (TOPAMAX) 50 MG tablet Take 1 tablet (50 mg total) by mouth 2 (two) times a day 60 tablet 3     "triamcinolone (KENALOG) 0.5 % cream Apply topically 3 (three) times a day 30 g 0    Spacer/Aero-Holding Chambers (AeroChamber Plus Robe-Vu) MISC Take 1 Device by mouth 2 (two) times a day (Patient not taking: Reported on 8/8/2024)      tretinoin (REFISSA) 0.05 % cream Apply 1 application. topically if needed (Patient not taking: Reported on 8/8/2024)       No current facility-administered medications for this visit.     Allergies   Allergen Reactions    Peanut (Diagnostic) - Food Allergy Anaphylaxis       Objective   Vitals: Blood pressure 116/82, pulse 103, height 5' 6\" (1.676 m), weight 93.5 kg (206 lb 3.2 oz), SpO2 99%, not currently breastfeeding.  Invasive Devices       Peripheral Intravenous Line  Duration             Peripheral IV 10/03/22 Left Antecubital 675 days                    Physical Exam  Vitals reviewed.   Constitutional:       General: She is not in acute distress.     Appearance: She is well-developed. She is not diaphoretic.   HENT:      Head: Normocephalic and atraumatic.   Eyes:      Conjunctiva/sclera: Conjunctivae normal.      Pupils: Pupils are equal, round, and reactive to light.   Neck:      Thyroid: No thyromegaly.   Cardiovascular:      Rate and Rhythm: Normal rate and regular rhythm.   Pulmonary:      Effort: Pulmonary effort is normal. No respiratory distress.      Breath sounds: Normal breath sounds.   Abdominal:      General: Bowel sounds are normal.      Palpations: Abdomen is soft.   Musculoskeletal:         General: Normal range of motion.      Cervical back: Normal range of motion and neck supple.   Skin:     General: Skin is warm and dry.      Findings: No rash.   Neurological:      Mental Status: She is alert and oriented to person, place, and time.      Motor: No abnormal muscle tone.   Psychiatric:         Behavior: Behavior normal.         The history was obtained from the review of the chart and from the patient.    Lab Results:      Component      Latest Ref Rng 8/10/2023 " "8/17/2023 10/7/2023 1/3/2024 3/29/2024   TESTOSTERONE FREE      0.0 - 4.2 pg/mL 2.8        Testosterone, Total, LC/MS      13 - 71 ng/dL 24        CORTISOL,F,UG/L,U      Undefined ug/L  6       CORTISOL 24H URINARY FREE      6 - 42 ug/24 hr  15       DHEA-SO4      110.0 - 431.7 ug/dL 189.0        TSH 3RD GENERATON      0.450 - 4.500 uIU/mL 4.995 (H)   0.736  1.800  0.521    FREE T4      0.61 - 1.12 ng/dL 0.89   0.83  1.03  0.94    17-OH PROGESTERONE      ng/dL 19        PROLACTIN      3.34 - 26.72 ng/mL 22.99        INSULIN-LIKE GROWTH FACTOR-1      101 - 347 ng/mL 163          Component      Latest Ref Rng 7/20/2024   TESTOSTERONE FREE      0.0 - 4.2 pg/mL    Testosterone, Total, LC/MS      13 - 71 ng/dL    CORTISOL,F,UG/L,U      Undefined ug/L    CORTISOL 24H URINARY FREE      6 - 42 ug/24 hr    DHEA-SO4      110.0 - 431.7 ug/dL    TSH 3RD GENERATON      0.450 - 4.500 uIU/mL 0.534    FREE T4      0.61 - 1.12 ng/dL 1.00    17-OH PROGESTERONE      ng/dL    PROLACTIN      3.34 - 26.72 ng/mL    INSULIN-LIKE GROWTH FACTOR-1      101 - 347 ng/mL       Legend:  (H) High    Future Appointments   Date Time Provider Department Center   9/10/2024 11:40 AM KIKA Botello [facility]   9/10/2024  4:00 PM [facility] MRI 1 [facility]   10/7/2024  1:20 PM Argenis Hager MD [facility]   11/19/2024  2:30 PM Omayra Amanda MD [facility]       Portions of the record may have been created with voice recognition software. Occasional wrong word or \"sound a like\" substitutions may have occurred due to the inherent limitations of voice recognition software. Read the chart carefully and recognize, using context, where substitutions have occurred.    "

## 2024-09-10 ENCOUNTER — HOSPITAL ENCOUNTER (OUTPATIENT)
Dept: MRI IMAGING | Facility: HOSPITAL | Age: 24
Discharge: HOME | End: 2024-09-10
Attending: PSYCHIATRY & NEUROLOGY
Payer: COMMERCIAL

## 2024-09-10 ENCOUNTER — OFFICE VISIT (OUTPATIENT)
Dept: FAMILY MEDICINE CLINIC | Facility: HOSPITAL | Age: 24
End: 2024-09-10
Payer: COMMERCIAL

## 2024-09-10 VITALS
HEART RATE: 89 BPM | HEIGHT: 66 IN | DIASTOLIC BLOOD PRESSURE: 88 MMHG | OXYGEN SATURATION: 99 % | WEIGHT: 209 LBS | BODY MASS INDEX: 33.59 KG/M2 | SYSTOLIC BLOOD PRESSURE: 128 MMHG

## 2024-09-10 DIAGNOSIS — Z76.89 ESTABLISHING CARE WITH NEW DOCTOR, ENCOUNTER FOR: Primary | ICD-10-CM

## 2024-09-10 DIAGNOSIS — F32.A DEPRESSION, UNSPECIFIED DEPRESSION TYPE: ICD-10-CM

## 2024-09-10 DIAGNOSIS — E55.9 VITAMIN D DEFICIENCY: ICD-10-CM

## 2024-09-10 DIAGNOSIS — F41.9 ANXIETY: ICD-10-CM

## 2024-09-10 DIAGNOSIS — G43.709 CHRONIC MIGRAINE WITHOUT AURA: ICD-10-CM

## 2024-09-10 DIAGNOSIS — Z13.0 SCREENING FOR DEFICIENCY ANEMIA: ICD-10-CM

## 2024-09-10 DIAGNOSIS — J45.40 MODERATE PERSISTENT ASTHMA WITHOUT COMPLICATION: ICD-10-CM

## 2024-09-10 PROBLEM — R74.8 ELEVATED LIVER ENZYMES: Status: RESOLVED | Noted: 2022-08-18 | Resolved: 2024-09-10

## 2024-09-10 PROCEDURE — 70553 MRI BRAIN STEM W/O & W/DYE: CPT

## 2024-09-10 PROCEDURE — A9585 GADOBUTROL INJECTION: HCPCS | Performed by: NURSE PRACTITIONER

## 2024-09-10 PROCEDURE — 99203 OFFICE O/P NEW LOW 30 MIN: CPT | Performed by: NURSE PRACTITIONER

## 2024-09-10 RX ORDER — GADOBUTROL 604.72 MG/ML
9 INJECTION INTRAVENOUS
Status: DISCONTINUED | OUTPATIENT
Start: 2024-09-10 | End: 2024-09-14 | Stop reason: HOSPADM

## 2024-09-10 RX ORDER — GADOBUTROL 604.72 MG/ML
9 INJECTION INTRAVENOUS
Status: COMPLETED | OUTPATIENT
Start: 2024-09-10 | End: 2024-09-10

## 2024-09-10 RX ADMIN — GADOBUTROL 9 ML: 604.72 INJECTION INTRAVENOUS at 16:21

## 2024-09-10 NOTE — ASSESSMENT & PLAN NOTE
KOKI score 7 today.  Reports chronic anxiety, was seen psychiatrist at her [employer].  Has tried multiple SSRIs as well as SNRIs as well as Abilify and BuSpar without improvement.  She is not currently in therapy but willing to restart.  She declines medicinal treatment at this time.

## 2024-09-10 NOTE — ASSESSMENT & PLAN NOTE
History of moderate persistent asthma without complication.  Adherent Adderall as needed.  She denies any proximal dyspnea.  Only uses rescue inhaler with illness.

## 2024-09-10 NOTE — PROGRESS NOTES
[facility]   Irena ANAND    Assessment/Plan:   1. Establishing care with new doctor, encounter for  2. Anxiety  Assessment & Plan:  KOKI score 7 today.  Reports chronic anxiety, was seen psychiatrist at her [place].  Has tried multiple SSRIs as well as SNRIs as well as Abilify and BuSpar without improvement.  She is not currently in therapy but willing to restart.  She declines medicinal treatment at this time.  Orders:  -     Comprehensive metabolic panel; Future  3. Depression, unspecified depression type  Assessment & Plan:  PHQ-2/9 Depression Screening    Little interest or pleasure in doing things: 0 - not at all  Feeling down, depressed, or hopeless: 1 - several days  Trouble falling or staying asleep, or sleeping too much: 2 - more than half the days  Feeling tired or having little energy: 2 - more than half the days  Poor appetite or overeatin - not at all  Feeling bad about yourself - or that you are a failure or have let yourself or your family down: 1 - several days  Trouble concentrating on things, such as reading the newspaper or watching television: 0 - not at all  Moving or speaking so slowly that other people could have noticed. Or the opposite - being so fidgety or restless that you have been moving around a lot more than usual: 0 - not at all  Thoughts that you would be better off dead, or of hurting yourself in some way: 0 - not at all  PHQ-9 Score: 6  PHQ-9 Interpretation: Mild depression        History of MDD, recurrent.  Has tried and failed multiple SSRIs, Cymbalta, Abilify in the past.  She is not currently in therapy which I encourage at this appointment today.  She is reluctant to trial any new medications at this point in time.  She is working on her diet as well as physical activity and managing her hypothyroidism which she feels is effectively treating her depressive symptoms.  Orders:  -     Comprehensive metabolic panel; Future  4. Vitamin D deficiency  -      Vitamin D 25 hydroxy; Future  5. Screening for deficiency anemia  -     CBC and differential; Future  6. Moderate persistent asthma without complication  Assessment & Plan:  History of moderate persistent asthma without complication.  Adherent Adderall as needed.  She denies any proximal dyspnea.  Only uses rescue inhaler with illness.      Consider CBT for anxiety/depression  Obtain fasting blood work.  No food/caffeine for 8 hours prior.  Drink plenty of water.   Return for Annual physical, F/U after bloodwork.  Patient may call or return to office with any questions or concerns.     ______________________________________________________________________  Subjective:     Patient ID: Sujata Garvin is a 23 y.o. female.  Sujata Garvin  Chief Complaint   Patient presents with    Establish Care     Here to establish care.  History of hypothyroidism, asthma, migraines, anxiety, depression.  Recently graduated in the spring with her biology degree from [employer].  She is followed by endocrine for hypothyroidism and neurology for her history of migraines.  She reports overall depression and anxiety improved with management of her hypothyroidism.  She was seeing psychiatry at her [employer]; has been on Wellbutrin, Abilify, Cymbalta, Prozac, Lexapro in the past however reports either of side effects or not effective into treating her depression anxiety symptoms.    Trying to improve diet  Physical activity:  walking swimming paddle boarding.    Insomnia trouble getting to sleep.  Uses Atarax.  No caffeine  Rare alcohol              Depression Screening and Follow-up Plan: Patient's depression screening was positive with a PHQ-9 score of 6.         The following portions of the patient's history were reviewed and updated as appropriate: allergies, current medications, past family history, past medical history, past social history, past surgical history, and problem list.    Review  of Systems   Constitutional: Negative.  Negative for activity change, appetite change, chills, fatigue, fever and unexpected weight change.   HENT: Negative.  Negative for congestion, ear pain, postnasal drip, sinus pain and trouble swallowing.    Eyes: Negative.  Negative for visual disturbance.   Respiratory: Negative.  Negative for cough, chest tightness and shortness of breath.    Cardiovascular: Negative.  Negative for chest pain, palpitations and leg swelling.   Gastrointestinal: Negative.  Negative for constipation and diarrhea.        IBS like symptoms.    Endocrine: Negative.    Genitourinary: Negative.  Negative for dysuria and menstrual problem.   Musculoskeletal: Negative.    Skin: Negative.    Allergic/Immunologic: Negative.  Negative for immunocompromised state.   Neurological: Negative.  Negative for dizziness and light-headedness.   Hematological: Negative.    Psychiatric/Behavioral:  Positive for dysphoric mood and sleep disturbance.         Trouble getting to sleep         Objective:      Vitals:    09/10/24 1250   BP: 128/88   Pulse: 89   SpO2: 99%      Physical Exam  Vitals and nursing note reviewed.   Constitutional:       Appearance: Normal appearance.   HENT:      Head: Normocephalic and atraumatic.      Right Ear: Tympanic membrane, ear canal and external ear normal.      Left Ear: Tympanic membrane, ear canal and external ear normal.      Nose: Nose normal.      Mouth/Throat:      Mouth: Mucous membranes are moist.      Pharynx: Oropharynx is clear.   Eyes:      Extraocular Movements: Extraocular movements intact.      Conjunctiva/sclera: Conjunctivae normal.      Pupils: Pupils are equal, round, and reactive to light.   Cardiovascular:      Rate and Rhythm: Normal rate and regular rhythm.      Pulses: Normal pulses.      Heart sounds: Normal heart sounds.   Pulmonary:      Effort: Pulmonary effort is normal.      Breath sounds: Normal breath sounds.   Abdominal:      General: Bowel sounds  "are normal.      Palpations: Abdomen is soft.   Musculoskeletal:         General: Normal range of motion.      Cervical back: Normal range of motion and neck supple.   Skin:     General: Skin is warm and dry.   Neurological:      General: No focal deficit present.      Mental Status: She is alert and oriented to person, place, and time.   Psychiatric:         Mood and Affect: Mood normal.         Behavior: Behavior normal.         Thought Content: Thought content normal.         Judgment: Judgment normal.           Portions of the record may have been created with voice recognition software. Occasional wrong word or \"sound alike\" substitutions may have occurred due to the inherent limitations of voice recognition software. Please review the chart carefully and recognize, using context, where substitutions/typographical errors may have occurred.       "

## 2024-09-10 NOTE — ASSESSMENT & PLAN NOTE
PHQ-2/9 Depression Screening    Little interest or pleasure in doing things: 0 - not at all  Feeling down, depressed, or hopeless: 1 - several days  Trouble falling or staying asleep, or sleeping too much: 2 - more than half the days  Feeling tired or having little energy: 2 - more than half the days  Poor appetite or overeatin - not at all  Feeling bad about yourself - or that you are a failure or have let yourself or your family down: 1 - several days  Trouble concentrating on things, such as reading the newspaper or watching television: 0 - not at all  Moving or speaking so slowly that other people could have noticed. Or the opposite - being so fidgety or restless that you have been moving around a lot more than usual: 0 - not at all  Thoughts that you would be better off dead, or of hurting yourself in some way: 0 - not at all  PHQ-9 Score: 6  PHQ-9 Interpretation: Mild depression        History of MDD, recurrent.  Has tried and failed multiple SSRIs, Cymbalta, Abilify in the past.  She is not currently in therapy which I encourage at this appointment today.  She is reluctant to trial any new medications at this point in time.  She is working on her diet as well as physical activity and managing her hypothyroidism which she feels is effectively treating her depressive symptoms.

## 2024-09-10 NOTE — PATIENT INSTRUCTIONS
Consider CBT for anxiety/depression  Obtain fasting blood work.  No food/caffeine for 8 hours prior.  Drink plenty of water.

## 2024-09-23 DIAGNOSIS — G43.709 CHRONIC MIGRAINE WITHOUT AURA: ICD-10-CM

## 2024-09-24 ENCOUNTER — PATIENT MESSAGE (OUTPATIENT)
Dept: NEUROLOGY | Facility: CLINIC | Age: 24
End: 2024-09-24

## 2024-09-24 NOTE — TELEPHONE ENCOUNTER
Per chart review, Topiramate was sent to pharmacy on 7/11/24 with 3 refills.    Outbound call placed to [facility] in [place].    Pharmacy stated that the medication was on hold but the pt get hs medications via [facility]. And stated to contact [facility].    Outbound call placed to [facility].     Pharmacy stated that the medication looks like it was on hold with the original Rx and 3 refills but on hold at the [facility]. Stated that pt has not filled scripts with Home delivery in years.       Outbound call placed to patient to verify which pharmacy the medication needs to be filled, no answer. LMOM okay per communication form. Requested to call the office back or send a [employer] message.     The pharmacy already has the Rx with 3 refills. No new order is needed.

## 2024-09-25 NOTE — PATIENT COMMUNICATION
Called [facility] and spoke with the pharmacist. The pharmacist confirmed that there are refills of topiramate on file at the pharmacy. The pharmacist requested that the pt contact the pharmacy and advise if she would like the medication to be prepared for  or mail order. Sent pt a message through [employer].

## 2024-10-07 ENCOUNTER — TELEMEDICINE (OUTPATIENT)
Dept: DERMATOLOGY | Facility: CLINIC | Age: 24
End: 2024-10-07
Payer: COMMERCIAL

## 2024-10-07 VITALS — BODY MASS INDEX: 32.14 KG/M2 | HEIGHT: 66 IN | WEIGHT: 200 LBS

## 2024-10-07 DIAGNOSIS — L70.0 ACNE VULGARIS: Primary | ICD-10-CM

## 2024-10-07 PROCEDURE — 99204 OFFICE O/P NEW MOD 45 MIN: CPT | Performed by: DERMATOLOGY

## 2024-10-07 RX ORDER — SPIRONOLACTONE 100 MG/1
TABLET, FILM COATED ORAL
Qty: 30 TABLET | Refills: 3 | Status: SHIPPED | OUTPATIENT
Start: 2024-10-07

## 2024-10-07 NOTE — PROGRESS NOTES
"Virtual Regular Visit  Name: Sujata Garvin      : 2000      MRN: [id number]  Encounter Provider: Argenis Hager MD  Encounter Date: 10/7/2024   Encounter department: [facility] DERMATOLOGY [place]    Verification of patient location:    Patient is located at Home in the following state in which I hold an active license [place]    Assessment & Plan  Acne vulgaris    Orders:    spironolactone (ALDACTONE) 100 mg tablet; Take 1/2 tab po nightly for 7 days then increase to 1 tab po nightly if tolerating well. Do not get pregnant while taking- Ensure pregnancy prevention practices such as birth control of barrier protection are used.        Encounter provider Argenis Hager MD    The patient was identified by name and date of birth. Sujata Garvin was informed that this is a telemedicine visit and that the visit is being conducted through the Epic Embedded platform. She agrees to proceed..  My office door was closed. No one else was in the room.  She acknowledged consent and understanding of privacy and security of the video platform. The patient has agreed to participate and understands they can discontinue the visit at any time.    Patient is aware this is a billable service.           Objective     Ht 5' 6\" (1.676 m)   Wt 90.7 kg (200 lb)   BMI 32.28 kg/m²   Physical Exam    Visit Time  Total Visit Duration: 15 min    [facility]at[facility] Note     Patient Name: Sujata Garvin  Encounter Date: 10/7/24     Have you been cared for by a [facility] Dermatologist in the last 3 years and, if so, which description applies to you?    NO.   I am considered a \"new\" patient and must complete all patient intake questions. I am FEMALE/of child-bearing potential.    REVIEW OF SYSTEMS:  Have you recently had or currently have any of the following? Recent fever or chills? No  Any non-healing wound? No  Are you pregnant or planning to become pregnant? No  Are you " "currently or planning to be nursing or breast feeding? No   PAST MEDICAL HISTORY:  Have you personally ever had or currently have any of the following?  If \"YES,\" then please provide more detail. Skin cancer (such as Melanoma, Basal Cell Carcinoma, Squamous Cell Carcinoma?  No  Tuberculosis, HIV/AIDS, Hepatitis B or C: No  Radiation Treatment No   HISTORY OF IMMUNOSUPPRESSION:   Do you have a history of any of the following:  Systemic Immunosuppression such as Diabetes, Biologic or Immunotherapy, Chemotherapy, Organ Transplantation, Bone Marrow Transplantation or Prednsione?  No    Answering \"YES\" requires the addition of the dotphrase \"IMMUNOSUPPRESSED\" as the first diagnosis of the patient's visit.   FAMILY HISTORY:  Any \"first degree relatives\" (parent, brother, sister, or child) with the following?    Skin Cancer, Pancreatic or Other Cancer? No   PATIENT EXPERIENCE:    Do you want the Dermatologist to perform a COMPLETE skin exam today including a clinical examination under the \"bra and underwear\" areas?  NO  If necessary, do we have your permission to call and leave a detailed message on your Preferred Phone number that includes your specific medical information?  Yes      Allergies   Allergen Reactions    Peanut (Diagnostic) - Food Allergy Anaphylaxis      Current Outpatient Medications:     albuterol (PROVENTIL HFA,VENTOLIN HFA) 90 mcg/act inhaler, , Disp: , Rfl:     budesonide-formoterol (SYMBICORT) 160-4.5 mcg/act inhaler, Inhale 2 puffs 2 (two) times a day, Disp: 1 g, Rfl: 0    cetirizine (ZyrTEC) 10 mg tablet, Take 1 tablet by mouth daily, Disp: , Rfl:     fluticasone (FLONASE) 50 mcg/act nasal spray, 1 spray into each nostril daily as needed, Disp: , Rfl:     hydrOXYzine HCL (ATARAX) 50 mg tablet, TAKE 1 TABLET BY MOUTH EVERYDAY AT BEDTIME, Disp: 90 tablet, Rfl: 1    Levothyroxine Sodium (Tirosint) 88 MCG CAPS, 1 capsule daily. TIROSINT DAILY, Disp: 90 capsule, Rfl: 1    Nurtec 75 MG TBDP, Take 1 tablet " "by mouth if needed, Disp: , Rfl:     topiramate (TOPAMAX) 50 MG tablet, Take 1 tablet (50 mg total) by mouth 2 (two) times a day, Disp: 60 tablet, Rfl: 3    triamcinolone (KENALOG) 0.5 % cream, Apply topically 3 (three) times a day, Disp: 30 g, Rfl: 0    EPINEPHrine (EPIPEN) 0.3 mg/0.3 mL SOAJ, Inject 0.3 mL (0.3 mg total) into a muscle once for 1 dose PRN, Disp: 1 each, Rfl: 0    Spacer/Aero-Holding Chambers (AeroChamber Plus Robe-Vu) MISC, Take 1 Device by mouth 2 (two) times a day (Patient not taking: Reported on 8/8/2024), Disp: , Rfl:           Whom besides the patient is providing clinical information about today's encounter?   NO ADDITIONAL HISTORIAN (patient alone provided history)    Physical Exam and Assessment/Plan by Diagnosis:    ACNE VULGARIS (\"COMMON ACNE\")    Physical Exam:  Psychiatric/Mood:  Anatomic Location Affected:  face, predominantly jaw line, neck  Morphological Description:  Open/Closed Comedones:  Few (\"Mild\")  Inflammatory Papules/Pustules:  Several (\"Moderate\")  Nodules:  Rare (\"Almost Clear\")  Scarring:  No evidence (\"Clear\")  Excoriations:  No evidence (\"Clear\")  Local Skin Redness/Erythema:  No evidence (\"Clear\")  Local Skin Dryness/Scaling:  No evidence (\"Clear\")  Local Skin Dyspigmentation:  No evidence (\"Clear\")  Pertinent Positives:  Pertinent Negatives:    Additional History of Present Condition:  Patient has had acne been going on since patient was 14, mainly around chin and jaw, cystic painful. Does not get it anywhere else. Patient saw derm 1x previously when patient was 18, got a topical antibiotic and oral antibiotic which didn't really work because patient has really dry skin. Patient also tried OTC benzoyl peroxide and salicylic acid, in form of topical treatment and face washes. Patient was also on spironolactone before 2-3 years ago, which helped a little but patient had dry mouth side effect. Patient feels like her acne gets slightly worse around menstrual period but has " acne constantly. Currently, patient uses gentle face cleanser and light moisturizer as patient has really dry skin on rest of face. Patient will sometimes use benzoyl peroxide spot treatment which really dries her skin out.       History notable for acne that flares around menses  Denies irregular menses  Discussed that history and physical are consistent with hormonal acne   Educated that hormonal acne does particularly well with medications that target hormones, including spironolactone and OCP.  Denies personal history of low blood pressure, liver disease, kidney disease, hyperkalemia  Educated that unless over age 45 or with a history of renal/liver disease, hyperkalemia, or medication interactions, evidence-based medicine does not support routine lab studies in patients on spironolactone.   Patient states skin is very sensitive so declines topical therapy        Discussed that treatment is directed at improving skin appearance and reducing the likelihood of scarring. Discussed theraputic ladder including topical OTC treatments, topical prescriptions, and oral medications. Discussed side effects as noted below.    Plan today:      AM:  Gentle cleanser such as Cerave, Cetaphil or Vanicream  SPF 30 or greater         PM:    Start spironolactone 50 mg PO nightly for 7 days qhs then increase to 100 mg PO qhs if tolerating medication after week 1    Discussed that we can titrate to lowest effective dose if she experiences side effects. Offered to start at 12.5 mg, but patient is frustrated with her acne and wants to start at 50 mg working up to 100 mg nightly      She denies a history of liver or kidney disease or hyperkalemia and eats a balanced diet.   The risks of spironolactone were reviewed including birth defects, mood change, irritability, GI upset, headache, liver injury, hyperkalemia, spotting/menstrual irregularity, breast tenderness, hypotension, dizziness/lightheadedness/orthostatic symptoms, etc.  Educated to limit high potassium foods like bananas, avocados and coconut water while on this medication.     She will maintain birth control or pregnancy prevention practices while taking it and is aware she cannot safely get pregnant on this medication.   For any concerns, she will stop the medication and call the office.     If the patient is under age 45 and without a history of liver or renal disease, hyperkalemia, or concerning medication interactions, current evidence based practice does not merit routine labs.     Follow up in 3 months, sooner for concerns.        Call with any questions or concerns before next follow-up visit; do not stop medications abruptly without consulting provider. Call our office at [phone] (SKIN).  It is better to be safe than to be sorry!          Scribe Attestation      I,:   am acting as a scribe while in the presence of the attending physician.:       I,:   personally performed the services described in this documentation    as scribed in my presence.:

## 2024-10-17 DIAGNOSIS — E03.9 ACQUIRED HYPOTHYROIDISM: ICD-10-CM

## 2024-10-17 RX ORDER — LEVOTHYROXINE SODIUM 88 UG/1
CAPSULE ORAL
Qty: 90 CAPSULE | Refills: 0 | Status: SHIPPED | OUTPATIENT
Start: 2024-10-17

## 2024-10-17 RX ORDER — TOPIRAMATE 50 MG/1
50 TABLET, FILM COATED ORAL 2 TIMES DAILY
Qty: 60 TABLET | Refills: 0 | Status: SHIPPED | OUTPATIENT
Start: 2024-10-17

## 2024-11-05 ENCOUNTER — APPOINTMENT (OUTPATIENT)
Dept: LAB | Facility: CLINIC | Age: 24
End: 2024-11-05
Payer: COMMERCIAL

## 2024-11-05 DIAGNOSIS — F41.9 ANXIETY: ICD-10-CM

## 2024-11-05 DIAGNOSIS — Z13.0 SCREENING FOR DEFICIENCY ANEMIA: ICD-10-CM

## 2024-11-05 DIAGNOSIS — E03.9 HYPOTHYROIDISM, UNSPECIFIED TYPE: ICD-10-CM

## 2024-11-05 DIAGNOSIS — F32.A DEPRESSION, UNSPECIFIED DEPRESSION TYPE: ICD-10-CM

## 2024-11-05 DIAGNOSIS — E55.9 VITAMIN D DEFICIENCY: ICD-10-CM

## 2024-11-05 LAB
25(OH)D3 SERPL-MCNC: 24.6 NG/ML (ref 30–100)
ALBUMIN SERPL BCG-MCNC: 4.6 G/DL (ref 3.5–5)
ALP SERPL-CCNC: 65 U/L (ref 34–104)
ALT SERPL W P-5'-P-CCNC: 33 U/L (ref 7–52)
ANION GAP SERPL CALCULATED.3IONS-SCNC: 9 MMOL/L (ref 4–13)
AST SERPL W P-5'-P-CCNC: 23 U/L (ref 13–39)
BASOPHILS # BLD AUTO: 0.06 THOUSANDS/ΜL (ref 0–0.1)
BASOPHILS NFR BLD AUTO: 1 % (ref 0–1)
BILIRUB SERPL-MCNC: 0.46 MG/DL (ref 0.2–1)
BUN SERPL-MCNC: 10 MG/DL (ref 5–25)
CALCIUM SERPL-MCNC: 9.7 MG/DL (ref 8.4–10.2)
CHLORIDE SERPL-SCNC: 106 MMOL/L (ref 96–108)
CO2 SERPL-SCNC: 23 MMOL/L (ref 21–32)
CREAT SERPL-MCNC: 0.72 MG/DL (ref 0.6–1.3)
EOSINOPHIL # BLD AUTO: 0.2 THOUSAND/ΜL (ref 0–0.61)
EOSINOPHIL NFR BLD AUTO: 3 % (ref 0–6)
ERYTHROCYTE [DISTWIDTH] IN BLOOD BY AUTOMATED COUNT: 12.2 % (ref 11.6–15.1)
GFR SERPL CREATININE-BSD FRML MDRD: 119 ML/MIN/1.73SQ M
GLUCOSE SERPL-MCNC: 83 MG/DL (ref 65–140)
HCT VFR BLD AUTO: 45.2 % (ref 34.8–46.1)
HGB BLD-MCNC: 15.8 G/DL (ref 11.5–15.4)
IMM GRANULOCYTES # BLD AUTO: 0.03 THOUSAND/UL (ref 0–0.2)
IMM GRANULOCYTES NFR BLD AUTO: 0 % (ref 0–2)
LYMPHOCYTES # BLD AUTO: 2.38 THOUSANDS/ΜL (ref 0.6–4.47)
LYMPHOCYTES NFR BLD AUTO: 33 % (ref 14–44)
MCH RBC QN AUTO: 31 PG (ref 26.8–34.3)
MCHC RBC AUTO-ENTMCNC: 35 G/DL (ref 31.4–37.4)
MCV RBC AUTO: 89 FL (ref 82–98)
MONOCYTES # BLD AUTO: 0.44 THOUSAND/ΜL (ref 0.17–1.22)
MONOCYTES NFR BLD AUTO: 6 % (ref 4–12)
NEUTROPHILS # BLD AUTO: 4.2 THOUSANDS/ΜL (ref 1.85–7.62)
NEUTS SEG NFR BLD AUTO: 57 % (ref 43–75)
NRBC BLD AUTO-RTO: 0 /100 WBCS
PLATELET # BLD AUTO: 296 THOUSANDS/UL (ref 149–390)
PMV BLD AUTO: 10.9 FL (ref 8.9–12.7)
POTASSIUM SERPL-SCNC: 4 MMOL/L (ref 3.5–5.3)
PROT SERPL-MCNC: 7.5 G/DL (ref 6.4–8.4)
RBC # BLD AUTO: 5.09 MILLION/UL (ref 3.81–5.12)
SODIUM SERPL-SCNC: 138 MMOL/L (ref 135–147)
T4 FREE SERPL-MCNC: 1.13 NG/DL (ref 0.61–1.12)
TSH SERPL DL<=0.05 MIU/L-ACNC: 0.93 UIU/ML (ref 0.45–4.5)
WBC # BLD AUTO: 7.31 THOUSAND/UL (ref 4.31–10.16)

## 2024-11-05 PROCEDURE — 80053 COMPREHEN METABOLIC PANEL: CPT

## 2024-11-05 PROCEDURE — 84439 ASSAY OF FREE THYROXINE: CPT

## 2024-11-05 PROCEDURE — 84443 ASSAY THYROID STIM HORMONE: CPT

## 2024-11-05 PROCEDURE — 36415 COLL VENOUS BLD VENIPUNCTURE: CPT

## 2024-11-05 PROCEDURE — 85025 COMPLETE CBC W/AUTO DIFF WBC: CPT

## 2024-11-05 PROCEDURE — 82306 VITAMIN D 25 HYDROXY: CPT

## 2024-11-13 ENCOUNTER — OFFICE VISIT (OUTPATIENT)
Dept: FAMILY MEDICINE CLINIC | Facility: HOSPITAL | Age: 24
End: 2024-11-13
Payer: COMMERCIAL

## 2024-11-13 VITALS
BODY MASS INDEX: 33.57 KG/M2 | SYSTOLIC BLOOD PRESSURE: 112 MMHG | OXYGEN SATURATION: 99 % | HEART RATE: 93 BPM | DIASTOLIC BLOOD PRESSURE: 80 MMHG | WEIGHT: 208 LBS

## 2024-11-13 DIAGNOSIS — J45.40 MODERATE PERSISTENT ASTHMA WITHOUT COMPLICATION: ICD-10-CM

## 2024-11-13 DIAGNOSIS — Z23 NEED FOR INFLUENZA VACCINATION: ICD-10-CM

## 2024-11-13 DIAGNOSIS — F32.A DEPRESSION, UNSPECIFIED DEPRESSION TYPE: ICD-10-CM

## 2024-11-13 DIAGNOSIS — F41.9 ANXIETY: ICD-10-CM

## 2024-11-13 DIAGNOSIS — Z23 ENCOUNTER FOR IMMUNIZATION: ICD-10-CM

## 2024-11-13 DIAGNOSIS — Z00.00 WELL ADULT EXAM: Primary | ICD-10-CM

## 2024-11-13 PROCEDURE — 90471 IMMUNIZATION ADMIN: CPT

## 2024-11-13 PROCEDURE — 99395 PREV VISIT EST AGE 18-39: CPT | Performed by: NURSE PRACTITIONER

## 2024-11-13 PROCEDURE — 90673 RIV3 VACCINE NO PRESERV IM: CPT

## 2024-11-13 PROCEDURE — 90472 IMMUNIZATION ADMIN EACH ADD: CPT

## 2024-11-13 PROCEDURE — 90677 PCV20 VACCINE IM: CPT

## 2024-11-13 NOTE — PROGRESS NOTES
History of Present Illness   Well Adult Physical   Patient here for a comprehensive physical exam.    Returns for physical.  Reviewed recent blood work.  She is trying to optimize her diet and increase physical activity as she is struggling to lose weight.  Currently looking for a job.  Persistent anxiety/depressive moments she feels is situational.  Continues to consider CBT.  Intermittent sleep disturbance treated with melatonin or magnesium effectively.  Rare use of Atarax.            Diet and Physical Activity  Diet: well balanced diet trying to optimize diet.    Weight concerns: Patient has class 1 obesity (BMI 30-34.9)  Exercise: frequently   EtOH: rare/occasional/daily/social/none: rare     Depression Screen  PHQ-2/9 Depression Screening    Little interest or pleasure in doing things: 0 - not at all  Feeling down, depressed, or hopeless: 1 - several days  Trouble falling or staying asleep, or sleeping too much: 0 - not at all  Feeling tired or having little energy: 1 - several days  Poor appetite or overeatin - not at all  Feeling bad about yourself - or that you are a failure or have let yourself or your family down: 0 - not at all  Trouble concentrating on things, such as reading the newspaper or watching television: 0 - not at all  Moving or speaking so slowly that other people could have noticed. Or the opposite - being so fidgety or restless that you have been moving around a lot more than usual: 0 - not at all  Thoughts that you would be better off dead, or of hurting yourself in some way: 0 - not at all  PHQ-9 Score: 2  PHQ-9 Interpretation: No or Minimal depression          General Health  Hearing: Normal:  bilateral  Vision: goes for regular eye exams  Dental: regular dental visits     History:  LMP: 24  : 0  Para: 0    Cancer Screening  Colononoscopy N/A  Mammogram N/A   Pap N/A  Abnormal pap? not asked  Smoker never Annual screening with low-dose helical computed tomography (CT)  for patients age 55 to 74 years with history of smoking at least 30 pack-years and, if a former smoker, had quit within the previous 15 years         The following portions of the patient's history were reviewed and updated as appropriate: allergies, current medications, past family history, past medical history, past social history, past surgical history and problem list.    Review of Systems     Review of Systems   Constitutional: Negative.  Negative for activity change, appetite change, chills, fatigue and fever.   HENT: Negative.  Negative for congestion, ear pain, postnasal drip and sinus pain.    Eyes: Negative.    Respiratory: Negative.  Negative for cough and shortness of breath.    Cardiovascular: Negative.  Negative for chest pain and leg swelling.   Gastrointestinal: Negative.  Negative for constipation and diarrhea.   Endocrine: Negative.    Genitourinary: Negative.  Negative for dysuria and menstrual problem.   Musculoskeletal: Negative.    Skin: Negative.    Allergic/Immunologic: Negative.  Negative for immunocompromised state.   Neurological: Negative.  Negative for dizziness and light-headedness.   Hematological: Negative.    Psychiatric/Behavioral:  Positive for dysphoric mood and sleep disturbance.         Occasional sleep disturbance.  Uses melatonin and magnesium as needed.       Past Medical History     Past Medical History:   Diagnosis Date    Anxiety     Asthma     Depression     Hypothyroid     Migraines     Pneumonia due to COVID-19 virus 09/19/2022    seen in [place] urgent [facility]. CXR reviewed and showed no focal consolidation. was put on paxlovid. see scanned progress note/xray report       Past Surgical History     History reviewed. No pertinent surgical history.    Social History     Social History     Socioeconomic History    Marital status: Single     Spouse name: None    Number of children: None    Years of education: None    Highest education level: None   Occupational History     Occupation: Student   Tobacco Use    Smoking status: Never    Smokeless tobacco: Never   Vaping Use    Vaping status: Never Used   Substance and Sexual Activity    Alcohol use: Never     Comment: Does not drink alcohol - As per [employer]     Drug use: Never     Comment: No - As per [employer]     Sexual activity: Never   Other Topics Concern    None   Social History Narrative    Sexual abuse: No    Exercise: Occasionally    Domestic violence: No     History of drug/alcohol abuse: Denies    Lives with parents    Is a college student at [employer]     Social Drivers of Health     Financial Resource Strain: Not on file   Food Insecurity: Not on file   Transportation Needs: Not on file   Physical Activity: Not on file   Stress: Not on file   Social Connections: Not on file   Intimate Partner Violence: Not on file   Housing Stability: Not on file       Family History     Family History   Problem Relation Age of Onset    Hypertension Mother     Completed Suicide  Father     Hypertension Maternal Grandmother     Heart disease Maternal Grandfather     Hypertension Maternal Grandfather     Diabetes Paternal Grandfather        Current Medications       Current Outpatient Medications:     albuterol (PROVENTIL HFA,VENTOLIN HFA) 90 mcg/act inhaler, , Disp: , Rfl:     budesonide-formoterol (SYMBICORT) 160-4.5 mcg/act inhaler, Inhale 2 puffs 2 (two) times a day, Disp: 1 g, Rfl: 0    cetirizine (ZyrTEC) 10 mg tablet, Take 1 tablet by mouth daily, Disp: , Rfl:     fluticasone (FLONASE) 50 mcg/act nasal spray, 1 spray into each nostril daily as needed, Disp: , Rfl:     hydrOXYzine HCL (ATARAX) 50 mg tablet, TAKE 1 TABLET BY MOUTH EVERYDAY AT BEDTIME, Disp: 90 tablet, Rfl: 1    Levothyroxine Sodium (Tirosint) 88 MCG CAPS, 1 capsule daily. TIROSINT DAILY, Disp: 90 capsule, Rfl: 0    Nurtec 75 MG TBDP, Take 1 tablet by mouth if needed, Disp: , Rfl:     spironolactone (ALDACTONE) 100 mg tablet, Take 1/2 tab po  nightly for 7 days then increase to 1 tab po nightly if tolerating well. Do not get pregnant while taking- Ensure pregnancy prevention practices such as birth control of barrier protection are used., Disp: 30 tablet, Rfl: 3    topiramate (TOPAMAX) 50 MG tablet, Take 1 tablet (50 mg total) by mouth 2 (two) times a day, Disp: 60 tablet, Rfl: 0    triamcinolone (KENALOG) 0.5 % cream, Apply topically 3 (three) times a day (Patient taking differently: Apply topically daily as needed), Disp: 30 g, Rfl: 0    EPINEPHrine (EPIPEN) 0.3 mg/0.3 mL SOAJ, Inject 0.3 mL (0.3 mg total) into a muscle once for 1 dose PRN, Disp: 1 each, Rfl: 0    Spacer/Aero-Holding Chambers (AeroChamber Plus Robe-Vu) MISC, Take 1 Device by mouth 2 (two) times a day (Patient not taking: Reported on 8/8/2024), Disp: , Rfl:      Allergies     Allergies   Allergen Reactions    Peanut (Diagnostic) - Food Allergy Anaphylaxis       Objective     /80   Pulse 93   Wt 94.3 kg (208 lb)   SpO2 99%   BMI 33.57 kg/m²   Wt Readings from Last 3 Encounters:   11/13/24 94.3 kg (208 lb)   10/07/24 90.7 kg (200 lb)   09/10/24 94.8 kg (209 lb)     BP Readings from Last 3 Encounters:   11/13/24 112/80   09/10/24 128/88   08/08/24 116/82     Pulse Readings from Last 3 Encounters:   11/13/24 93   09/10/24 89   08/08/24 103     Body mass index is 33.57 kg/m².     Physical Exam  Vitals and nursing note reviewed.   Constitutional:       Appearance: Normal appearance.   HENT:      Head: Normocephalic and atraumatic.      Right Ear: Tympanic membrane, ear canal and external ear normal.      Left Ear: Tympanic membrane, ear canal and external ear normal.      Nose: Nose normal.      Mouth/Throat:      Mouth: Mucous membranes are moist.      Pharynx: Oropharynx is clear.   Eyes:      Extraocular Movements: Extraocular movements intact.      Conjunctiva/sclera: Conjunctivae normal.      Pupils: Pupils are equal, round, and reactive to light.   Cardiovascular:      Rate and  Rhythm: Normal rate and regular rhythm.      Pulses: Normal pulses.      Heart sounds: Normal heart sounds.   Pulmonary:      Effort: Pulmonary effort is normal.      Breath sounds: Normal breath sounds.   Abdominal:      General: Bowel sounds are normal.      Palpations: Abdomen is soft.   Musculoskeletal:         General: Normal range of motion.      Cervical back: Normal range of motion and neck supple.   Skin:     General: Skin is warm and dry.   Neurological:      General: No focal deficit present.      Mental Status: She is alert and oriented to person, place, and time.   Psychiatric:         Mood and Affect: Mood and affect normal.         Speech: Speech normal.         Behavior: Behavior normal. Behavior is cooperative.         Thought Content: Thought content normal.         Cognition and Memory: Cognition and memory normal.         Judgment: Judgment normal.           No results found.    Health Maintenance     Health Maintenance   Topic Date Due    Hepatitis C Screening  Never done    HIV Screening  Never done    Cervical Cancer Screening  Never done    Annual Physical  06/28/2024    COVID-19 Vaccine (5 - 2024-25 season) 09/01/2024    Depression Screening  11/13/2025    DTaP,Tdap,and Td Vaccines (3 - Td or Tdap) 06/28/2033    Zoster Vaccine (1 of 2) 12/04/2051    RSV Vaccine Age 60+ Years (1 - 1-dose 75+ series) 12/04/2076    Pneumococcal Vaccine: Pediatrics (0 to 5 Years) and At-Risk Patients (6 to 64 Years)  Completed    HIB Vaccine  Completed    IPV Vaccine  Completed    Meningococcal ACWY Vaccine  Completed    Influenza Vaccine  Completed    HPV Vaccine  Completed    RSV Vaccine age 0-20 Months  Aged Out    Hepatitis A Vaccine  Aged Out     Immunization History   Administered Date(s) Administered    COVID-19 Moderna mRNA Vaccine 12 Yr+ 50 mcg/0.5 mL (Spikevax) 01/30/2024    COVID-19 PFIZER VACCINE 0.3 ML IM 03/21/2021, 04/11/2021, 11/21/2021    HPV 11/24/2015, 01/27/2016, 06/01/2016    Hep B, adult  "07/06/2023, 08/08/2023, 01/08/2024    Hepatitis B 2000, 02/01/2002, 12/12/2002    HiB 02/01/2002, 05/07/2002, 03/11/2003    INFLUENZA 09/01/2022    IPV 02/01/2002, 04/05/2002, 06/26/2003, 12/08/2005    Influenza Quadrivalent Preservative Free 3 years and older IM 09/24/2023    Influenza Recombinant Preservative Free Im 11/13/2024    MMR 03/11/2003, 01/11/2007    Pneumococcal 04/05/2002    Pneumococcal Conjugate 13-Valent 04/05/2002    Pneumococcal Conjugate Vaccine 20-valent (Pcv20), Polysace 11/13/2024    Tdap 10/09/2012, 06/28/2023    Varicella 12/12/2002       Laboratory Results:   Lab Results   Component Value Date    WBC 7.31 11/05/2024    WBC 8.72 06/12/2023    WBC 9.14 04/01/2023    HGB 15.8 (H) 11/05/2024    HGB 15.5 (H) 06/12/2023    HGB 14.2 04/01/2023    HCT 45.2 11/05/2024    HCT 45.7 06/12/2023    HCT 41.3 04/01/2023    MCV 89 11/05/2024    MCV 90 06/12/2023    MCV 88 04/01/2023     11/05/2024     06/12/2023     04/01/2023     Lab Results   Component Value Date    BUN 10 11/05/2024    BUN 8 06/12/2023    BUN 11 04/01/2023     Lab Results   Component Value Date    GLUC 83 11/05/2024    GLUC 90 04/01/2023    GLUC 93 10/03/2022    ALT 33 11/05/2024    ALT 35 06/12/2023    ALT 35 04/01/2023    AST 23 11/05/2024    AST 19 06/12/2023    AST 18 04/01/2023     Lab Results   Component Value Date    TSH 7.350 (H) 08/17/2022     No results found for: \"A1C\"    Lipid Profile:   No results found for: \"CHOL\"  Lab Results   Component Value Date    HDL 62 06/12/2023    HDL 62 08/17/2022     No results found for: \"LDLC\"  Lab Results   Component Value Date    LDLCALC 95 06/12/2023    LDLCALC 110 (H) 08/17/2022     Lab Results   Component Value Date    TRIG 149 06/12/2023    TRIG 134 08/17/2022         Assessment/Plan   1. Well adult exam  2. Depression, unspecified depression type  -     Ambulatory referral to Psych Services; Future  3. Anxiety  -     Ambulatory referral to Psych Services; " Future  4. Moderate persistent asthma without complication  5. Need for influenza vaccination  -     influenza vaccine, recombinant, PF, 0.5 mL IM (Flublok)  6. Encounter for immunization  -     Pneumococcal Conjugate Vaccine 20-valent (Pcv20)      1. Healthy female exam.  2. Patient Counseling:   Nutrition: Stressed importance of a well balanced diet, moderation of sodium/saturated fat, caloric balance and sufficient intake of fiber  Exercise: Stressed the importance of regular exercise with a goal of 150 minutes per week  Dental Health: Discussed daily flossing and brushing and regular dental visits   Sexuality: Discussed sexually transmitted infections, use of condoms and prevention of unintended pregnancy  Alcohol Use:  Recommended moderation of alcohol intake  Injury Prevention: Discussed Safety Belts, Safety Helmets, and Smoke Detectors    Immunizations reviewed.   Discussed benefits of screening.  Discussed the patient's BMI with her.  The BMI is above average; BMI management plan is completed  3. Cancer Screening please schedule PAP  4. Labs reviewed  5.  Referral to behavioral health  6. Follow up next physical in 1 year.    KIKA Beltran

## 2024-11-19 ENCOUNTER — OFFICE VISIT (OUTPATIENT)
Dept: NEUROLOGY | Facility: CLINIC | Age: 24
End: 2024-11-19
Payer: COMMERCIAL

## 2024-11-19 VITALS
HEIGHT: 66 IN | BODY MASS INDEX: 33.43 KG/M2 | OXYGEN SATURATION: 99 % | WEIGHT: 208 LBS | SYSTOLIC BLOOD PRESSURE: 118 MMHG | DIASTOLIC BLOOD PRESSURE: 76 MMHG | HEART RATE: 100 BPM | TEMPERATURE: 97.2 F

## 2024-11-19 DIAGNOSIS — G43.109 MIGRAINE WITH AURA AND WITHOUT STATUS MIGRAINOSUS, NOT INTRACTABLE: ICD-10-CM

## 2024-11-19 DIAGNOSIS — G43.709 CHRONIC MIGRAINE WITHOUT AURA: Primary | ICD-10-CM

## 2024-11-19 PROCEDURE — 99215 OFFICE O/P EST HI 40 MIN: CPT | Performed by: PSYCHIATRY & NEUROLOGY

## 2024-11-19 RX ORDER — RIMEGEPANT SULFATE 75 MG/75MG
75 TABLET, ORALLY DISINTEGRATING ORAL AS NEEDED
Qty: 8 TABLET | Refills: 1 | Status: SHIPPED | OUTPATIENT
Start: 2024-11-19

## 2024-11-19 RX ORDER — TOPIRAMATE 50 MG/1
50 TABLET, FILM COATED ORAL 2 TIMES DAILY
Qty: 60 TABLET | Refills: 2 | Status: SHIPPED | OUTPATIENT
Start: 2024-11-19

## 2024-11-19 NOTE — PROGRESS NOTES
"Name: Sujata Garvin      : 2000      MRN: [id number]  Encounter Provider: Omayra Amanda MD  Encounter Date: 2024   Encounter department: [facility]    : 23-year-old female who is here as a follow-up for chronic migraines.  Assessment & Plan  Chronic migraine without aura  Current  treatment plan -   Preventative medications:  continue with Topamax 50 twice a day, tolerating it well without any issues.  Failed medications in the past -none    Abortive medications: continue with Nurtec 75 mg as needed.  Not at this time    Botox/CGRPs medications: Not at this time    Acute treatment in the office: No headache today    Lifestyle Modifications:  1.Maintain headache diary.  We discussed an MAO for a smart phone is \"Migraine buddy\"  2. When patient has a moderate to severe headache, they should seek rest, initiate relaxation and apply cold compresses to the head.   3. Hydration - Please drink at least 64 ounces of water a day to help remain hydrated.  4. Sleep -  Maintain regular sleep schedule. Adults need at least 7-8 hours of uninterrupted a night. Maintain good sleep hygiene:Adults need at least 7-8 hours of uninterrupted a night.   5. Diet - Patient is to have regular frequent meals to prevent headache onset. Avoid dietary trigger. (aged cheese, peanuts, MSG, aspartame and nitrates).  6. Medications - Limit over the counter medications such as Tylenol, Ibuprofen, Aleve, Excedrin. (No more than 3 times a week).  7. Exercise - Daily exercise is not only good for your heart but also good for your mental health. Recommend 4-5 times a week for 20 minutes.   Topamax 50 twice a day  Orders:    topiramate (TOPAMAX) 50 MG tablet; Take 1 tablet (50 mg total) by mouth 2 (two) times a day    Nurtec 75 MG TBDP; Take 1 tablet (75 mg total) by mouth if needed (headache)    Migraine with aura and without status migrainosus, not intractable       Follow up in 6 " months.    I would be happy to see the patient sooner if any new questions/concerns arise.  Patient/Guardian was advised to the call the office if they have any questions and concerns in the meantime.     Patient/Guardian does understand that if they have any new stroke like symptoms such as facial droop on one side, weakness/paralysis on either side, speech trouble, numbness on one side, balance issues, any vision changes, extreme dizziness or any new headache, to call 9-1-1 immediately or to proceed to the nearest ER immediately.        History of Present Illness   HPI    23-year-old female who is here as a follow-up for chronic migraines.  She states that she is having a lot less headaches during the past few months than usual.  Her last headache was 2 months ago she says.  She says her headaches are 7 out of 10.  They are located around the eye, pressure/squeezing/pounding/throbbing/icepick like/sharp.  Having about 1-2 headaches a month but it fell out last lately.    Headaches typically last an hour but if she has Nurtec right in the beginning it is aborted completely.  Associated symptoms are headaches are spots before the eyes, blurring of vision, nausea, vomiting, light sensitivity, noise sensitivity.    She is feeling lightheaded and dizzy especially in the mornings and wants to discuss this today.    Review of Systems   Constitutional: Negative.    HENT: Negative.     Eyes: Negative.    Respiratory: Negative.     Cardiovascular: Negative.    Gastrointestinal: Negative.    Endocrine: Negative.    Genitourinary: Negative.    Musculoskeletal: Negative.    Skin: Negative.    Allergic/Immunologic: Negative.    Neurological: Negative.    Hematological: Negative.    Psychiatric/Behavioral: Negative.     All other systems reviewed and are negative.    I have personally reviewed the MA's review of systems and made changes as necessary.         Objective   There were no vitals taken for this visit.    Physical  Exam  General - patient is alert   Speech - no dysarthria noted, no aphasia noted.     Neuro:   Cranial nerves: PERRL, EOMI, facial sensation intact to soft touch in V1, V2 and V3, no facial asymmetry noted, uvula/palate midline, tongue midline.   Motor: 5/5 throughout, normal tone, no pronator drift noted.   Sensory - intact to soft touch throughout  Reflexes - 2+ throughout  Coordination - no ataxia/dysmetria noted  Gait - normal    Neurologic Exam    Results/Data:  I have reviewed the results and images from the in detail with the patient.        Administrative Statements   I have spent a total time of 40 minutes in caring for this patient on the day of the visit/encounter including Risks and benefits of tx options, Instructions for management, Documenting in the medical record, Reviewing / ordering tests, medicine, procedures  , Obtaining or reviewing history  , and Communicating with other healthcare professionals .

## 2024-11-20 ENCOUNTER — OFFICE VISIT (OUTPATIENT)
Age: 24
End: 2024-11-20
Payer: COMMERCIAL

## 2024-11-20 VITALS — BODY MASS INDEX: 33.43 KG/M2 | WEIGHT: 208 LBS | HEIGHT: 66 IN | TEMPERATURE: 97.6 F

## 2024-11-20 DIAGNOSIS — M60.20 FOREIGN BODY REACTION: Primary | ICD-10-CM

## 2024-11-20 PROCEDURE — 99214 OFFICE O/P EST MOD 30 MIN: CPT | Performed by: REGISTERED NURSE

## 2024-11-20 RX ORDER — MUPIROCIN 20 MG/G
OINTMENT TOPICAL 3 TIMES DAILY
Qty: 30 G | Refills: 0 | Status: SHIPPED | OUTPATIENT
Start: 2024-11-20

## 2024-11-20 NOTE — PROGRESS NOTES
"[facility] Note     Patient Name: Sujata Garvin  Encounter Date: 11/20/2024     Have you been cared for by a [facility] Dermatologist in the last 3 years and, if so, which description applies to you?    Yes.  I have been here within the last 3 years, and my medical history has NOT changed since that time.  I am FEMALE/of child-bearing potential.    REVIEW OF SYSTEMS:  Have you recently had or currently have any of the following? No changes in my recent health.   PAST MEDICAL HISTORY:  Have you personally ever had or currently have any of the following?  If \"YES,\" then please provide more detail. No changes in my medical history.   HISTORY OF IMMUNOSUPPRESSION: Do you have a history of any of the following:  Systemic Immunosuppression such as Diabetes, Biologic or Immunotherapy, Chemotherapy, Organ Transplantation, Bone Marrow Transplantation or Prednisone?  No     Answering \"YES\" requires the addition of the dotphrase \"IMMUNOSUPPRESSED\" as the first diagnosis of the patient's visit.   FAMILY HISTORY:  Any \"first degree relatives\" (parent, brother, sister, or child) with the following?    No changes in my family's known health.   PATIENT EXPERIENCE:    Do you want the Dermatologist to perform a COMPLETE skin exam today including a clinical examination under the \"bra and underwear\" areas?  NO  If necessary, do we have your permission to call and leave a detailed message on your Preferred Phone number that includes your specific medical information?  Yes      Allergies   Allergen Reactions    Peanut (Diagnostic) - Food Allergy Anaphylaxis      Current Outpatient Medications:     albuterol (PROVENTIL HFA,VENTOLIN HFA) 90 mcg/act inhaler, , Disp: , Rfl:     budesonide-formoterol (SYMBICORT) 160-4.5 mcg/act inhaler, Inhale 2 puffs 2 (two) times a day, Disp: 1 g, Rfl: 0    cetirizine (ZyrTEC) 10 mg tablet, Take 1 tablet by mouth daily, Disp: , Rfl:     EPINEPHrine (EPIPEN) 0.3 mg/0.3 mL " SOAJ, Inject 0.3 mL (0.3 mg total) into a muscle once for 1 dose PRN, Disp: 1 each, Rfl: 0    fluticasone (FLONASE) 50 mcg/act nasal spray, 1 spray into each nostril daily as needed, Disp: , Rfl:     hydrOXYzine HCL (ATARAX) 50 mg tablet, TAKE 1 TABLET BY MOUTH EVERYDAY AT BEDTIME, Disp: 90 tablet, Rfl: 1    Levothyroxine Sodium (Tirosint) 88 MCG CAPS, 1 capsule daily. TIROSINT DAILY, Disp: 90 capsule, Rfl: 0    Nurtec 75 MG TBDP, Take 1 tablet (75 mg total) by mouth if needed (headache), Disp: 8 tablet, Rfl: 1    spironolactone (ALDACTONE) 100 mg tablet, Take 1/2 tab po nightly for 7 days then increase to 1 tab po nightly if tolerating well. Do not get pregnant while taking- Ensure pregnancy prevention practices such as birth control of barrier protection are used., Disp: 30 tablet, Rfl: 3    topiramate (TOPAMAX) 50 MG tablet, Take 1 tablet (50 mg total) by mouth 2 (two) times a day, Disp: 60 tablet, Rfl: 2    triamcinolone (KENALOG) 0.5 % cream, Apply topically 3 (three) times a day, Disp: 30 g, Rfl: 0    Spacer/Aero-Holding Chambers (AeroChamber Plus Robe-Vu) MISC, Take 1 Device by mouth 2 (two) times a day (Patient not taking: Reported on 8/8/2024), Disp: , Rfl:           Whom besides the patient is providing clinical information about today's encounter?   NO ADDITIONAL HISTORIAN (patient alone provided history)    Physical Exam and Assessment/Plan by Diagnosis:    FOREIGN BODY REACTION  Physical Exam:  Anatomic Location Affected:  right ear  Morphological Description:   yellowish crust overlying a small subcutaneous papule.   Pertinent Positives:  Pertinent Negatives:    Additional History of Present Condition:  Per patient noticed bump about 3 months ago after piercing area. The bump has significantly decreased in size. There's also a yellowish crust around the same area. No tenderness, pruritus, ulceration and or bleeding.     Assessment and Plan:  Based on a thorough discussion of this condition and the  management approach to it (including a comprehensive discussion of the known risks, side effects and potential benefits of treatment), the patient (family) agrees to implement the following specific plan:  Discussed that given history and skin exams today, it appears to be a foreign body reaction, less likely a keloid as that will not usually improve without treatment within this short period of time. The yellowish crust is concerning for a possible superficial infection and so recommend mupirocin.  Start mupirocin 2% ointment apply 3 times a day for 3-4 weeks  Advised to reach out to us for further evaluation if no improvement in 2 months time.       Scribe Attestation      I,:  Monica House MA am acting as a scribe while in the presence of the attending physician.:       I,:  Bhaskar Levi MD personally performed the services described in this documentation    as scribed in my presence.:

## 2024-11-20 NOTE — ASSESSMENT & PLAN NOTE
Follow up in 6 months.    I would be happy to see the patient sooner if any new questions/concerns arise.  Patient/Guardian was advised to the call the office if they have any questions and concerns in the meantime.     Patient/Guardian does understand that if they have any new stroke like symptoms such as facial droop on one side, weakness/paralysis on either side, speech trouble, numbness on one side, balance issues, any vision changes, extreme dizziness or any new headache, to call 9-1-1 immediately or to proceed to the nearest ER immediately.

## 2024-11-20 NOTE — ASSESSMENT & PLAN NOTE
"Current  treatment plan -   Preventative medications:  continue with Topamax 50 twice a day, tolerating it well without any issues.  Failed medications in the past -none    Abortive medications: continue with Nurtec 75 mg as needed.  Not at this time    Botox/CGRPs medications: Not at this time    Acute treatment in the office: No headache today    Lifestyle Modifications:  1.Maintain headache diary.  We discussed an MAO for a smart phone is \"Migraine buddy\"  2. When patient has a moderate to severe headache, they should seek rest, initiate relaxation and apply cold compresses to the head.   3. Hydration - Please drink at least 64 ounces of water a day to help remain hydrated.  4. Sleep -  Maintain regular sleep schedule. Adults need at least 7-8 hours of uninterrupted a night. Maintain good sleep hygiene:Adults need at least 7-8 hours of uninterrupted a night.   5. Diet - Patient is to have regular frequent meals to prevent headache onset. Avoid dietary trigger. (aged cheese, peanuts, MSG, aspartame and nitrates).  6. Medications - Limit over the counter medications such as Tylenol, Ibuprofen, Aleve, Excedrin. (No more than 3 times a week).  7. Exercise - Daily exercise is not only good for your heart but also good for your mental health. Recommend 4-5 times a week for 20 minutes.   Topamax 50 twice a day  Orders:    topiramate (TOPAMAX) 50 MG tablet; Take 1 tablet (50 mg total) by mouth 2 (two) times a day    Nurtec 75 MG TBDP; Take 1 tablet (75 mg total) by mouth if needed (headache)    "

## 2024-11-22 ENCOUNTER — TELEPHONE (OUTPATIENT)
Age: 24
End: 2024-11-22

## 2024-11-22 ENCOUNTER — TELEPHONE (OUTPATIENT)
Dept: NEUROLOGY | Facility: CLINIC | Age: 24
End: 2024-11-22

## 2024-11-22 NOTE — TELEPHONE ENCOUNTER
Writer attempted to contact pt regarding referral for [facility] to verify services needed and place pt on [facility] wait list. Lvm to call writer back.

## 2024-11-27 NOTE — TELEPHONE ENCOUNTER
Writer contacted pt to verify services needed and place her on [facility] wait list. Person who answered the phone said something writer couldn't understand and hung up immediately after. Referral closed.

## 2024-12-04 ENCOUNTER — TELEPHONE (OUTPATIENT)
Dept: DERMATOLOGY | Facility: CLINIC | Age: 24
End: 2024-12-04

## 2024-12-04 NOTE — TELEPHONE ENCOUNTER
LMOM that 01/06/25 appt w/Dr Hager was cx, due to a change in her schedule, she's not going to the [place] office anymore and not doing Virtual Visits anymore, please give the office a call to r/s.

## 2024-12-18 DIAGNOSIS — J45.40 MODERATE PERSISTENT ASTHMA WITHOUT COMPLICATION: ICD-10-CM

## 2024-12-18 RX ORDER — BUDESONIDE AND FORMOTEROL FUMARATE DIHYDRATE 160; 4.5 UG/1; UG/1
2 AEROSOL RESPIRATORY (INHALATION) 2 TIMES DAILY
Qty: 1 G | Refills: 0 | Status: SHIPPED | OUTPATIENT
Start: 2024-12-18

## 2024-12-18 NOTE — TELEPHONE ENCOUNTER
Reason for call:   [x] Refill   [] Prior Auth  [] Other:     Office:   [x] PCP/Provider - Irena Lutz (recent change in PCP)  [] Specialty/Provider -     Medication: budesonide-formoterol (SYMBICORT) 160-4.5 mcg/act inhaler     Dose/Frequency:  Inhale 2 puffs 2 (two) times a day    Quantity: 1    Pharmacy: [facility]    Does the patient have enough for 3 days?   [] Yes   [x] No - Send as HP to POD - Leaving on vacation, needs to  today

## 2024-12-30 DIAGNOSIS — E03.9 ACQUIRED HYPOTHYROIDISM: ICD-10-CM

## 2024-12-31 RX ORDER — LEVOTHYROXINE SODIUM 88 UG/1
CAPSULE ORAL
Qty: 90 CAPSULE | Refills: 1 | Status: SHIPPED | OUTPATIENT
Start: 2024-12-31

## 2025-01-06 ENCOUNTER — OFFICE VISIT (OUTPATIENT)
Dept: DERMATOLOGY | Facility: CLINIC | Age: 25
End: 2025-01-06
Payer: COMMERCIAL

## 2025-01-06 DIAGNOSIS — L70.0 ACNE VULGARIS: Primary | ICD-10-CM

## 2025-01-06 DIAGNOSIS — L71.9 ROSACEA: ICD-10-CM

## 2025-01-06 PROCEDURE — 99214 OFFICE O/P EST MOD 30 MIN: CPT

## 2025-01-06 RX ORDER — METRONIDAZOLE 10 MG/G
GEL TOPICAL DAILY
Qty: 60 G | Refills: 3 | Status: SHIPPED | OUTPATIENT
Start: 2025-01-06

## 2025-01-06 RX ORDER — SPIRONOLACTONE 50 MG/1
TABLET, FILM COATED ORAL
Qty: 30 TABLET | Refills: 3 | Status: SHIPPED | OUTPATIENT
Start: 2025-01-06

## 2025-01-06 RX ORDER — SPIRONOLACTONE 100 MG/1
TABLET, FILM COATED ORAL
Qty: 30 TABLET | Refills: 3 | Status: SHIPPED | OUTPATIENT
Start: 2025-01-06

## 2025-01-06 RX ORDER — CLINDAMYCIN PHOSPHATE 10 UG/ML
LOTION TOPICAL
Qty: 60 ML | Refills: 5 | Status: SHIPPED | OUTPATIENT
Start: 2025-01-06

## 2025-01-06 NOTE — PROGRESS NOTES
"[facility] Note     Patient Name: Sujata Garvin  Encounter Date: 1/6/25     Have you been cared for by a [facility] Dermatologist in the last 3 years and, if so, which description applies to you?    Yes.  I have been here within the last 3 years, and my medical history has NOT changed since that time.  I am FEMALE/of child-bearing potential.    REVIEW OF SYSTEMS:  Have you recently had or currently have any of the following? No changes in my recent health.   PAST MEDICAL HISTORY:  Have you personally ever had or currently have any of the following?  If \"YES,\" then please provide more detail. No changes in my medical history.   HISTORY OF IMMUNOSUPPRESSION: Do you have a history of any of the following:  Systemic Immunosuppression such as Diabetes, Biologic or Immunotherapy, Chemotherapy, Organ Transplantation, Bone Marrow Transplantation or Prednisone?  No     Answering \"YES\" requires the addition of the dotphrase \"IMMUNOSUPPRESSED\" as the first diagnosis of the patient's visit.   FAMILY HISTORY:  Any \"first degree relatives\" (parent, brother, sister, or child) with the following?    No changes in my family's known health.   PATIENT EXPERIENCE:    Do you want the Dermatologist to perform a COMPLETE skin exam today including a clinical examination under the \"bra and underwear\" areas?  NO  If necessary, do we have your permission to call and leave a detailed message on your Preferred Phone number that includes your specific medical information?  Yes      Allergies   Allergen Reactions   • Peanut (Diagnostic) - Food Allergy Anaphylaxis      Current Outpatient Medications:   •  albuterol (PROVENTIL HFA,VENTOLIN HFA) 90 mcg/act inhaler, , Disp: , Rfl:   •  budesonide-formoterol (SYMBICORT) 160-4.5 mcg/act inhaler, Inhale 2 puffs 2 (two) times a day, Disp: 1 g, Rfl: 0  •  cetirizine (ZyrTEC) 10 mg tablet, Take 1 tablet by mouth daily, Disp: , Rfl:   •  clindamycin (CLEOCIN T) 1 % lotion, " "Apply thin layer to affected skin in the mornings after washing and drying skin., Disp: 60 mL, Rfl: 5  •  fluticasone (FLONASE) 50 mcg/act nasal spray, 1 spray into each nostril daily as needed, Disp: , Rfl:   •  hydrOXYzine HCL (ATARAX) 50 mg tablet, TAKE 1 TABLET BY MOUTH EVERYDAY AT BEDTIME, Disp: 90 tablet, Rfl: 1  •  Levothyroxine Sodium (Tirosint) 88 MCG CAPS, 1 capsule daily. TIROSINT DAILY, Disp: 90 capsule, Rfl: 1  •  metroNIDAZOLE (METROGEL) 1 % gel, Apply topically daily To midface, Disp: 60 g, Rfl: 3  •  mupirocin (BACTROBAN) 2 % ointment, Apply topically 3 (three) times a day for 3-4 weeks, Disp: 30 g, Rfl: 0  •  Nurtec 75 MG TBDP, Take 1 tablet (75 mg total) by mouth if needed (headache), Disp: 8 tablet, Rfl: 1  •  spironolactone (ALDACTONE) 100 mg tablet, Take 1 tablet nightly along with a 50mg tablet nightly for a total of 150mg daily., Disp: 30 tablet, Rfl: 3  •  spironolactone (ALDACTONE) 50 mg tablet, Take 1 tablet nightly along with a100mg tablet nightly for a total of 150mg daily., Disp: 30 tablet, Rfl: 3  •  topiramate (TOPAMAX) 50 MG tablet, Take 1 tablet (50 mg total) by mouth 2 (two) times a day, Disp: 60 tablet, Rfl: 2  •  triamcinolone (KENALOG) 0.5 % cream, Apply topically 3 (three) times a day, Disp: 30 g, Rfl: 0  •  EPINEPHrine (EPIPEN) 0.3 mg/0.3 mL SOAJ, Inject 0.3 mL (0.3 mg total) into a muscle once for 1 dose PRN, Disp: 1 each, Rfl: 0  •  Spacer/Aero-Holding Chambers (AeroChamber Plus Robe-Vu) MISC, Take 1 Device by mouth 2 (two) times a day (Patient not taking: Reported on 8/8/2024), Disp: , Rfl:           Whom besides the patient is providing clinical information about today's encounter?   NO ADDITIONAL HISTORIAN (patient alone provided history)    Physical Exam and Assessment/Plan by Diagnosis:    ACNE VULGARIS (\"COMMON ACNE\")    Physical Exam:  Anatomic Location Affected: face-chin, jaw  Morphological Description: inflammatory papules and erythema    Additional History of " Present Condition:  Patient has been taking Spironolactone and reports some improvement-she is tolerating 100mg daily well with taking at night. She is still having cystic acne-recently had deep pimple on right temple. She uses Cetaphil cleanser and vanicream to moisturize. Not a good candidate for OCP given hx of migraine with aura and she reports she did not see improvement with her acne when on an OCP in the past. She is not currently using any prescription topicals because she gets excessively dry skin    Assessment and Plan:  We reviewed the causes of acne, the “kinds” of acne, and the expected clinical course.  We discussed treatment options ranging from over-the-counter products, topical retinoids, antibiotics, BP, hormonal therapies (OCPs/spironolactone), and isotretinoin (Accutane).  We reviewed specific over-the-counter interventions and medications. Recommended typical hygiene measures washing regularly with mild cleanser, and refraining from picking and popping any pimples. Recommended non-comedogenic sunscreen use daily.   Expectations of therapy discussed. Side effects, risks and benefits of medications discussed. A comprehensive handout with treatment plan provided. The phone number to call in case of questions or concerns (and instructions to stop medications in such a scenario) was provided.  After lengthy discussion of etiology and treatment options, we decided to implement the following personalized treatment plan:      Mornin. Gentle cleanser such as Cerave, Cetaphil, or Vanicream.   2. Apply Clindamycin lotion to entire affected areas of face. Follow with gentle moisturizer. Currently using vanicream.   3. Apply an oil-free sunscreen (SPF 30 or higher). Some options include Neutrogena oil-free moisturizer with SPF     Night:    1. Wash your face with a gentle face wash - like Cetaphil wash, Cerave Hydrating , LaRoche Hydrating Cleanser       Spironolactone - We will plan to  "increase from 100mg to 150 mg nightly.   -Discussed risks, benefits, side effects including breast tenderness, breast enlargement, spotting, diuresis, dizziness   -Limit bananas and avocados to one daily. Avoid coconut water while on this medicine     Follow up in 3 months. We discussed going back down to 100mg daily if any adverse effects were to develop with increase to 150mg daily.     Make sure all products you use on face are labeled as \"non-comedongenic\" or \"oil-free\" or \" does not clog pores\".    Acne can be frustrating and difficult to treat. Most acne regimens take 2-3 months to see an improvement, so stick with them. Don't give up! As always, call your doctor if you have any concerns about your medications.      ROSACEA    Physical Exam:  Anatomic Location Affected:  central face  Morphological Description:  Erythema and telangiectasias  Pertinent Positives:  Pertinent Negatives:    Additional History of Present Condition:  Patient reports redness on her face that is triggered by sun and hot drinks    Assessment and Plan:  Based on a thorough discussion of this condition and the management approach to it (including a comprehensive discussion of the known risks, side effects and potential benefits of treatment), the patient (family) agrees to implement the following specific plan:  Start using Metronidazole 1% gel once daily to mid face  Follow up 3 months    Rosacea is a chronic rash affecting the mid-face including the nose, cheeks, chin, forehead, and eyelids. The incidence is usually greatest between the ages of 30-60 years and is more common in people with fair skin. Common characteristics include redness, telangiectasias, papules and pustules over affected areas. Rosacea may look similar to acne, but there is a lack of comedones. Occasionally the eyes may also be involved in ocular rosacea. In advanced disease, enlargement of the sebaceous glands in the nose, termed rhinophyma, may be present. "     Rosacea results in red spots (papules) and sometimes pustules over the face, but unlike acne there are no blackheads, whiteheads, or cystic nodules. Patients often experience increased facial flushing with prominent blood vessels (erythematotelangiectatic rosacea) and dry, sensitive skin. These symptoms are exacerbated by sun exposure, hot or spicy foods, topical steroids and oil-based facial products.     In ocular rosacea, eyelids may be red and sore due to conjunctivitis, keratitis, and episcleritis. If rhinophyma develops due to enlargement of sebaceous glands, the patient may have an enlarged and irregularly shaped nose with prominent pores. In rosacea that is refractory to treatment, patients can develop persistent redness and swelling of the face due to lymphatic obstruction (Morbihan disease).     Distribution around the cheeks may be confused with the malar or “butterfly rash” of lupus. However, the rash of lupus spares the nasal creases and lacks papules and pustules. If signs of photosensitivity, oral ulcers, arthritis, and kidney dysfunction are present then consider referral to a rheumatologist.     There are many potential causes of rosacea including genetic, environmental, vascular, and inflammatory factors. These include, but are not limited to:  Chronic exposure to ultraviolet radiation   Increased immune responses in the form of cathelicidins that promote vessel dilation and infiltration with white blood cells (neutrophils) into the dermis  Increased matrix metalloproteinases such as collagen and elastase that remodel normal tissue may contribute to inflammation of the skin making it thicker and harder  There is some evidence to suggest that increased numbers of demodex mites on patient skin may contribute to rosacea papules     General Treatment Approach   Avoid exacerbating factors such as heat, spicy foods, and alcohol   Use daily SPF30+ sunscreen and other methods of coverage for sun  protection  Use water-based make-up   Avoid applying topical steroids to affected areas as they can cause perioral dermatitis and exacerbate rosacea     Topical Treatment Approach  Metronidazole cream or gel by itself or in combination with oral antibiotics for more severe cases  Azelaic acid cream or lotion is effective for mild inflammatory rosacea when applied twice daily to affected areas  Brimonidine gel and oxymetazoline hydrochloride cream can reduce facial redness temporarily   Ivermectin cream can treat papulopustular rosacea by controlling demodex mites and inflammation   Pimecrolimus cream or tacrolimus ointment twice a day for 2-3 months can help reduce inflammation    Oral Treatment Approach  Antibiotics such as doxycycline, minocycline, or erythromycin for 1-3 months  Clonidine and carvedilol can help reduce facial flushing and are generally well tolerated. Common side effects include low blood pressure, gastrointestinal upset, dry eyes, blurred vision and low heart rate.   Isotretinoin at low doses can be effective for long term treatment when antibiotics fail. Side effects may make it unsuitable for some patients.   NSAIDs such as diclofenac can help reduce discomfort and redness in the skin.     Procedural/Surgical Treatment Approach   Vascular lasers or intense pulsed light treatment may be used to treat persistent telangiectasia and papulopustular rosacea  Plastic surgery and carbon dioxide lasers may be used to treat rhinophyma       Scribe Attestation    I,:  Trav Paz am acting as a scribe while in the presence of the attending physician.:       I,:  Leah Pompa PA-C personally performed the services described in this documentation    as scribed in my presence.:

## 2025-01-26 DIAGNOSIS — J45.40 MODERATE PERSISTENT ASTHMA WITHOUT COMPLICATION: ICD-10-CM

## 2025-01-27 RX ORDER — BUDESONIDE AND FORMOTEROL FUMARATE DIHYDRATE 160; 4.5 UG/1; UG/1
2 AEROSOL RESPIRATORY (INHALATION) 2 TIMES DAILY
Qty: 30.6 G | Refills: 1 | Status: SHIPPED | OUTPATIENT
Start: 2025-01-27

## 2025-02-14 ENCOUNTER — OFFICE VISIT (OUTPATIENT)
Dept: ENDOCRINOLOGY | Facility: CLINIC | Age: 25
End: 2025-02-14
Payer: COMMERCIAL

## 2025-02-14 VITALS
SYSTOLIC BLOOD PRESSURE: 118 MMHG | WEIGHT: 204 LBS | BODY MASS INDEX: 32.78 KG/M2 | HEIGHT: 66 IN | DIASTOLIC BLOOD PRESSURE: 76 MMHG

## 2025-02-14 DIAGNOSIS — E66.01 MORBID OBESITY (HCC): ICD-10-CM

## 2025-02-14 DIAGNOSIS — E03.9 ACQUIRED HYPOTHYROIDISM: ICD-10-CM

## 2025-02-14 DIAGNOSIS — E55.9 VITAMIN D DEFICIENCY: ICD-10-CM

## 2025-02-14 DIAGNOSIS — E03.9 HYPOTHYROIDISM, UNSPECIFIED TYPE: Primary | ICD-10-CM

## 2025-02-14 DIAGNOSIS — I10 HYPERTENSION, UNSPECIFIED TYPE: ICD-10-CM

## 2025-02-14 PROCEDURE — 99214 OFFICE O/P EST MOD 30 MIN: CPT | Performed by: INTERNAL MEDICINE

## 2025-02-14 RX ORDER — LEVOTHYROXINE SODIUM 88 UG/1
CAPSULE ORAL
Qty: 90 CAPSULE | Refills: 1 | Status: CANCELLED | OUTPATIENT
Start: 2025-02-14

## 2025-02-14 RX ORDER — TIRZEPATIDE 2.5 MG/.5ML
2.5 INJECTION, SOLUTION SUBCUTANEOUS WEEKLY
Qty: 2 ML | Refills: 0 | Status: SHIPPED | OUTPATIENT
Start: 2025-02-14 | End: 2025-03-14

## 2025-02-14 NOTE — PATIENT INSTRUCTIONS
Start injecting Zepbound 2.5 mg weekly under the skin. Let us know in 2-3 weeks if you are tolerating it well so that we can increase the dose to 5mg weekly.   Get thyroid function testing done now, as well as again in 3 months.   Start taking Vitamin D3 2000 units daily.   Return for follow up in 3 months

## 2025-02-14 NOTE — PROGRESS NOTES
Sujata Garvin 24 y.o. female MRN: [id number]    Encounter: [id number]      Assessment & Plan     :  Assessment & Plan  Hypothyroidism, unspecified type  At this time patient is clinically and biochemically euthyroid.  Thyroid function testing in November 2024 demonstrated a normal TSH and free T4 at the upper limit, within margin of error.   Recommend continuing Tirosint 88 mcg daily  Will order updated thyroid function testing now and again prior to next scheduled follow up.  Will reach out to patient with results.   Next scheduled follow-up in 3 months.  Orders:    TSH, 3rd generation; Future    T4, free; Future    TSH, 3rd generation; Future    T4, free; Future    Morbid obesity (HCC)  Patient with obesity and hypertension currently requiring medical therapy with spironolactone.   Has tried diet changes for weight loss without significant success.  At this time wants to defer seeing a nutritionist.  Given age and diagnosis of hypertension, will benefit from weight loss.  Recommend starting Zepbound 2.5 mg weekly.  If tolerating well, will increase to 5 mg weekly.  Discussed with patient that stopping this medication may lead to rebound weight gain.  If no significant weight loss, we will discuss referral to nutritionist.  Orders:    tirzepatide (Zepbound) 2.5 mg/0.5 mL auto-injector; Inject 0.5 mL (2.5 mg total) under the skin once a week for 28 days    Vitamin D deficiency  Noted to have 25-OH-vitamin D of 24.6 on labs in November 2024  Currently not taking vitamin D supplements.   Recommend that she take vitamin D3 2000 units daily  Will recheck 25-OH-vitamin D levels prior to next scheduled follow-up.  Orders:    Vitamin D 25 hydroxy; Future    Acquired hypothyroidism  Plan as above  Orders:    TSH, 3rd generation; Future    T4, free; Future    TSH, 3rd generation; Future    T4, free; Future    Hypertension, unspecified type  Hypertension in a 24-year-old, currently taking spironolactone.    Suspect her weight is contributing to this.  Will benefit from weight loss.  Recommend starting Zepbound 2.5 mg weekly, if tolerating well will increase to 5 mg weekly.  Orders:    tirzepatide (Zepbound) 2.5 mg/0.5 mL auto-injector; Inject 0.5 mL (2.5 mg total) under the skin once a week for 28 days        CC:  Hypothyroid    History of Present Illness     HPI:  Sujata Garvin is a 24 y.o. female with a past medical history of hypothyroidism who is seen today in follow-up.  Initially seen in August 2023, last visit with Dr. Anaya 8/8/2024.  Currently takes Tirosint 88 mcg daily, denies issues with her regimen. She takes it first thing in the morning, however will occasionally have coffee with milk approximately 30 minutes afterwards.   Reports gaining weight which led to her hypothyroidism diagnosis. Has not been able to lose any weight despite normalization of thyroid function. She has tried changing her diet, including decreasing the amount of fried food and incorporating more vegetables.   Endorses improvement in fatigue since initiation of levothyroxine.  She also has dry skin, however states that this is baseline and runs in the family.  Denies heat or cold intolerance sweats, tremors, palpitations, hair or nail changes.  Denies changes in bowel habits, although notes history of alternating diarrhea and constipation.  Sleep has been good, however occasionally has insomnia.  Unsure whether she snores or not.  Denies changes in menstrual cycles.  Does not have neck swelling, difficulty swallowing, voice changes.  No family history of thyroid disease.  At this time she does not take any supplements or vitamins.  Does not take vitamin D.  All other systems were reviewed and are negative.    Review of Systems   Constitutional:  Negative for appetite change, fatigue and unexpected weight change.   HENT:  Negative for congestion, trouble swallowing and voice change.    Eyes:  Negative for visual  disturbance.   Respiratory:  Negative for cough and shortness of breath.    Cardiovascular:  Negative for chest pain and palpitations.   Gastrointestinal:  Positive for constipation and diarrhea. Negative for abdominal pain, nausea and vomiting.   Endocrine: Negative for cold intolerance, heat intolerance, polydipsia and polyuria.   Genitourinary:  Negative for frequency.   Musculoskeletal:  Negative for myalgias.   Skin:  Negative for rash.   Neurological:  Negative for dizziness, tremors, weakness and light-headedness.   Psychiatric/Behavioral:  Negative for sleep disturbance.    All other systems reviewed and are negative.      Historical Information   Past Medical History:   Diagnosis Date    Acne     Allergic     Anxiety     Asthma     Depression     Disease of thyroid gland     Eczema     Hypothyroid     Migraines     Pneumonia due to COVID-19 virus 09/19/2022    seen in [place] urgent [facility]. CXR reviewed and showed no focal consolidation. was put on paxlovid. see scanned progress note/xray report    [place]      No past surgical history on file.  Social History   Social History     Substance and Sexual Activity   Alcohol Use Never    Comment: Does not drink alcohol - As per [employer]      Social History     Substance and Sexual Activity   Drug Use Never    Comment: No - As per [employer]      Social History     Tobacco Use   Smoking Status Never   Smokeless Tobacco Never     Family History:   Family History   Problem Relation Age of Onset    Hypertension Mother     Completed Suicide  Father     Hypertension Maternal Grandmother     Heart disease Maternal Grandfather     Hypertension Maternal Grandfather     Diabetes Paternal Grandfather        Meds/Allergies   Current Outpatient Medications   Medication Sig Dispense Refill    albuterol (PROVENTIL HFA,VENTOLIN HFA) 90 mcg/act inhaler       budesonide-formoterol (SYMBICORT) 160-4.5 mcg/act inhaler Inhale 2 puffs 2 (two) times a day 30.6 g 1     "cetirizine (ZyrTEC) 10 mg tablet Take 1 tablet by mouth daily      clindamycin (CLEOCIN T) 1 % lotion Apply thin layer to affected skin in the mornings after washing and drying skin. 60 mL 5    fluticasone (FLONASE) 50 mcg/act nasal spray 1 spray into each nostril daily as needed      hydrOXYzine HCL (ATARAX) 50 mg tablet TAKE 1 TABLET BY MOUTH EVERYDAY AT BEDTIME 90 tablet 1    Levothyroxine Sodium (Tirosint) 88 MCG CAPS 1 capsule daily. TIROSINT DAILY 90 capsule 1    Nurtec 75 MG TBDP Take 1 tablet (75 mg total) by mouth if needed (headache) 8 tablet 1    spironolactone (ALDACTONE) 100 mg tablet Take 1 tablet nightly along with a 50mg tablet nightly for a total of 150mg daily. 30 tablet 3    spironolactone (ALDACTONE) 50 mg tablet Take 1 tablet nightly along with a100mg tablet nightly for a total of 150mg daily. 30 tablet 3    topiramate (TOPAMAX) 50 MG tablet Take 1 tablet (50 mg total) by mouth 2 (two) times a day 60 tablet 2    triamcinolone (KENALOG) 0.5 % cream Apply topically 3 (three) times a day 30 g 0    EPINEPHrine (EPIPEN) 0.3 mg/0.3 mL SOAJ Inject 0.3 mL (0.3 mg total) into a muscle once for 1 dose PRN 1 each 0    metroNIDAZOLE (METROGEL) 1 % gel Apply topically daily To midface (Patient not taking: Reported on 2/14/2025) 60 g 3    mupirocin (BACTROBAN) 2 % ointment Apply topically 3 (three) times a day for 3-4 weeks (Patient not taking: Reported on 2/14/2025) 30 g 0    Spacer/Aero-Holding Chambers (AeroChamber Plus Robe-Vu) MISC Take 1 Device by mouth 2 (two) times a day (Patient not taking: Reported on 8/8/2024)       No current facility-administered medications for this visit.     Allergies   Allergen Reactions    Peanut (Diagnostic) - Food Allergy Anaphylaxis       Objective   Vitals: Blood pressure 118/76, height 5' 6\" (1.676 m), weight 92.5 kg (204 lb), not currently breastfeeding.    Physical Exam  Vitals and nursing note reviewed.   Constitutional:       General: She is not in acute distress.    "  Appearance: Normal appearance. She is not ill-appearing, toxic-appearing or diaphoretic.   HENT:      Head: Normocephalic and atraumatic.      Nose: Nose normal.      Mouth/Throat:      Mouth: Mucous membranes are moist.      Pharynx: Oropharynx is clear.   Eyes:      General: No scleral icterus.        Right eye: No discharge.         Left eye: No discharge.      Extraocular Movements: Extraocular movements intact.      Conjunctiva/sclera: Conjunctivae normal.   Neck:      Comments: Thyroid gland not enlarged or tender to palpation. No palpable nodule.   Cardiovascular:      Rate and Rhythm: Normal rate and regular rhythm.      Pulses: Normal pulses.      Heart sounds: Normal heart sounds. No murmur heard.  Pulmonary:      Effort: Pulmonary effort is normal. No respiratory distress.      Breath sounds: Normal breath sounds. No wheezing, rhonchi or rales.   Abdominal:      General: Abdomen is flat. Bowel sounds are normal. There is no distension.      Palpations: Abdomen is soft.      Tenderness: There is no abdominal tenderness. There is no guarding or rebound.   Musculoskeletal:         General: Normal range of motion.      Cervical back: Normal range of motion and neck supple.   Skin:     General: Skin is warm and dry.      Coloration: Skin is not jaundiced or pale.   Neurological:      Mental Status: She is alert and oriented to person, place, and time. Mental status is at baseline.   Psychiatric:         Mood and Affect: Mood normal.         Behavior: Behavior normal.         Thought Content: Thought content normal.         Judgment: Judgment normal.          The history was obtained from the review of the chart, patient.    Lab Results:   Lab Results   Component Value Date/Time    TSH 3RD GENERATON 0.925 11/05/2024 11:30 AM    TSH 3RD GENERATON 0.534 07/20/2024 11:40 AM    TSH 3RD GENERATON 0.521 03/29/2024 12:48 PM    Free T4 1.13 (H) 11/05/2024 11:30 AM    Free T4 1.00 07/20/2024 11:40 AM    Free T4 0.94  "03/29/2024 12:48 PM       Imaging Studies:       Results Review Statement: No pertinent imaging studies reviewed.    Portions of the record may have been created with voice recognition software. Occasional wrong word or \"sound a like\" substitutions may have occurred due to the inherent limitations of voice recognition software. Read the chart carefully and recognize, using context, where substitutions have occurred.   "

## 2025-02-14 NOTE — ASSESSMENT & PLAN NOTE
Plan as above  Orders:    TSH, 3rd generation; Future    T4, free; Future    TSH, 3rd generation; Future    T4, free; Future

## 2025-02-14 NOTE — ASSESSMENT & PLAN NOTE
Hypertension in a 24-year-old, currently taking spironolactone.   Suspect her weight is contributing to this.  Will benefit from weight loss.  Recommend starting Zepbound 2.5 mg weekly, if tolerating well will increase to 5 mg weekly.  Orders:    tirzepatide (Zepbound) 2.5 mg/0.5 mL auto-injector; Inject 0.5 mL (2.5 mg total) under the skin once a week for 28 days

## 2025-02-18 ENCOUNTER — APPOINTMENT (OUTPATIENT)
Dept: LAB | Facility: CLINIC | Age: 25
End: 2025-02-18
Payer: COMMERCIAL

## 2025-02-18 ENCOUNTER — TELEPHONE (OUTPATIENT)
Dept: ENDOCRINOLOGY | Facility: CLINIC | Age: 25
End: 2025-02-18

## 2025-02-18 DIAGNOSIS — E03.9 HYPOTHYROIDISM, UNSPECIFIED TYPE: ICD-10-CM

## 2025-02-18 DIAGNOSIS — E03.9 ACQUIRED HYPOTHYROIDISM: ICD-10-CM

## 2025-02-18 LAB
T4 FREE SERPL-MCNC: 0.91 NG/DL (ref 0.61–1.12)
TSH SERPL DL<=0.05 MIU/L-ACNC: 0.98 UIU/ML (ref 0.45–4.5)

## 2025-02-18 PROCEDURE — 84439 ASSAY OF FREE THYROXINE: CPT

## 2025-02-18 PROCEDURE — 36415 COLL VENOUS BLD VENIPUNCTURE: CPT

## 2025-02-18 PROCEDURE — 84443 ASSAY THYROID STIM HORMONE: CPT

## 2025-02-18 NOTE — TELEPHONE ENCOUNTER
Received from [employer] requesting a prior authorization for Zepbound 2.5 mg / 0.5 ML auto injector     Key [id number]

## 2025-02-19 ENCOUNTER — RESULTS FOLLOW-UP (OUTPATIENT)
Dept: ENDOCRINOLOGY | Facility: CLINIC | Age: 25
End: 2025-02-19

## 2025-02-19 NOTE — TELEPHONE ENCOUNTER
PA for Zepbound) 2.5 mg/0.5 mLSUBMITTED to [employer]     via    [x][employer]-Case ID # [id number]     [x]PA sent as URGENT    All office notes, labs and other pertaining documents and studies sent. Clinical questions answered. Awaiting determination from insurance company.     Turnaround time for your insurance to make a decision on your Prior Authorization can take 7-21 business days.

## 2025-02-27 NOTE — TELEPHONE ENCOUNTER
PA for Zepbound) 2.5 mg/0.5 mL  DENIED    Reason:(Screenshot if applicable)        Message sent to office clinical pool Yes    Denial letter scanned into Media Yes    Appeal started No (Provider will need to decide if appeal is warranted and send clinical documentation to Prior Authorization Team for initiation.)    **Please follow up with your patient regarding denial and next steps**

## 2025-03-04 NOTE — TELEPHONE ENCOUNTER
PA for Zepbound) 2.5 mg/0.5 mL RESUBMITTED to [facility]     via      [x]Surescripts-Case ID # [id number]    [x]PA sent as URGENT    All office notes, labs and other pertaining documents and studies sent. Clinical questions answered. Awaiting determination from insurance company.     Turnaround time for your insurance to make a decision on your Prior Authorization can take 7-21 business days.

## 2025-03-07 NOTE — TELEPHONE ENCOUNTER
PA for Zepbound) 2.5 mg/0.5 mL  APPROVED     Date(s) approved March 4, 2025 to March 4, 2026     Case #[id number]     Patient advised by          [x]MyChart Message  [x]Phone call   []LMOM  []L/M to call office as no active Communication consent on file  []Unable to leave detailed message as VM not approved on Communication consent       Pharmacy advised by    [x]Fax  []Phone call  []Secure Chat    Specialty Pharmacy    []     Approval letter scanned into Media Yes

## 2025-03-18 DIAGNOSIS — G43.709 CHRONIC MIGRAINE WITHOUT AURA: ICD-10-CM

## 2025-03-18 RX ORDER — TOPIRAMATE 50 MG/1
50 TABLET, FILM COATED ORAL 2 TIMES DAILY
Qty: 60 TABLET | Refills: 5 | Status: SHIPPED | OUTPATIENT
Start: 2025-03-18

## 2025-03-27 ENCOUNTER — APPOINTMENT (OUTPATIENT)
Dept: URGENT CARE | Facility: CLINIC | Age: 25
End: 2025-03-27

## 2025-03-27 ENCOUNTER — APPOINTMENT (OUTPATIENT)
Dept: LAB | Facility: CLINIC | Age: 25
End: 2025-03-27

## 2025-03-27 DIAGNOSIS — Z02.1 PRE-EMPLOYMENT EXAMINATION: Primary | ICD-10-CM

## 2025-03-27 DIAGNOSIS — Z02.1 PRE-EMPLOYMENT EXAMINATION: ICD-10-CM

## 2025-03-27 LAB — RUBV IGG SERPL IA-ACNC: 42.1 IU/ML

## 2025-03-27 PROCEDURE — 86762 RUBELLA ANTIBODY: CPT

## 2025-03-27 PROCEDURE — 86735 MUMPS ANTIBODY: CPT

## 2025-03-27 PROCEDURE — 36415 COLL VENOUS BLD VENIPUNCTURE: CPT

## 2025-03-27 PROCEDURE — 86787 VARICELLA-ZOSTER ANTIBODY: CPT

## 2025-03-27 PROCEDURE — 86765 RUBEOLA ANTIBODY: CPT

## 2025-03-27 PROCEDURE — 86480 TB TEST CELL IMMUN MEASURE: CPT

## 2025-03-28 DIAGNOSIS — E03.9 ACQUIRED HYPOTHYROIDISM: ICD-10-CM

## 2025-03-28 LAB
GAMMA INTERFERON BACKGROUND BLD IA-ACNC: 0.06 IU/ML
M TB IFN-G BLD-IMP: NEGATIVE
M TB IFN-G CD4+ BCKGRND COR BLD-ACNC: -0.01 IU/ML
M TB IFN-G CD4+ BCKGRND COR BLD-ACNC: -0.01 IU/ML
MEV IGG SER QL IA: 149 AU/ML
MEV IGG SER QL IA: POSITIVE
MITOGEN IGNF BCKGRD COR BLD-ACNC: 9.94 IU/ML
MUV IGG SER QL IA: 32.8 AU/ML
MUV IGG SER QL IA: POSITIVE
VZV IGG SER QL IA: 1.3 S/CO
VZV IGG SER QL IA: POSITIVE

## 2025-03-28 RX ORDER — LEVOTHYROXINE SODIUM 88 UG/1
CAPSULE ORAL
Qty: 90 CAPSULE | Refills: 1 | Status: SHIPPED | OUTPATIENT
Start: 2025-03-28

## 2025-04-02 DIAGNOSIS — E66.01 MORBID OBESITY (HCC): Primary | ICD-10-CM

## 2025-04-02 RX ORDER — TIRZEPATIDE 5 MG/.5ML
INJECTION, SOLUTION SUBCUTANEOUS
Qty: 2 ML | Refills: 0 | Status: SHIPPED | OUTPATIENT
Start: 2025-04-02

## 2025-04-10 ENCOUNTER — OFFICE VISIT (OUTPATIENT)
Dept: DERMATOLOGY | Facility: CLINIC | Age: 25
End: 2025-04-10
Payer: COMMERCIAL

## 2025-04-10 VITALS — BODY MASS INDEX: 32.28 KG/M2 | TEMPERATURE: 97.2 F | WEIGHT: 200 LBS

## 2025-04-10 DIAGNOSIS — L71.9 ROSACEA: ICD-10-CM

## 2025-04-10 DIAGNOSIS — L70.0 ACNE VULGARIS: Primary | ICD-10-CM

## 2025-04-10 PROCEDURE — 99214 OFFICE O/P EST MOD 30 MIN: CPT

## 2025-04-10 RX ORDER — CLINDAMYCIN PHOSPHATE 10 UG/ML
LOTION TOPICAL
Qty: 60 ML | Refills: 5 | Status: SHIPPED | OUTPATIENT
Start: 2025-04-10

## 2025-04-10 RX ORDER — TRETINOIN 0.25 MG/G
CREAM TOPICAL
Qty: 45 G | Refills: 4 | Status: SHIPPED | OUTPATIENT
Start: 2025-04-10

## 2025-04-10 RX ORDER — SPIRONOLACTONE 50 MG/1
TABLET, FILM COATED ORAL
Qty: 90 TABLET | Refills: 2 | Status: SHIPPED | OUTPATIENT
Start: 2025-04-10

## 2025-04-10 RX ORDER — SPIRONOLACTONE 100 MG/1
TABLET, FILM COATED ORAL
Qty: 30 TABLET | Refills: 3 | Status: CANCELLED | OUTPATIENT
Start: 2025-04-10

## 2025-04-10 NOTE — PROGRESS NOTES
"[facility] Note     Patient Name: Sujata Garvin  Encounter Date: 4/10/2025     Have you been cared for by a [facility] Dermatologist in the last 3 years and, if so, which description applies to you?    Yes.  I have been here within the last 3 years, and my medical history has NOT changed since that time.  I am FEMALE/of child-bearing potential.    REVIEW OF SYSTEMS:  Have you recently had or currently have any of the following? No changes in my recent health.   PAST MEDICAL HISTORY:  Have you personally ever had or currently have any of the following?  If \"YES,\" then please provide more detail. No changes in my medical history.   HISTORY OF IMMUNOSUPPRESSION: Do you have a history of any of the following:  Systemic Immunosuppression such as Diabetes, Biologic or Immunotherapy, Chemotherapy, Organ Transplantation, Bone Marrow Transplantation or Prednisone?  No     Answering \"YES\" requires the addition of the dotphrase \"IMMUNOSUPPRESSED\" as the first diagnosis of the patient's visit.   FAMILY HISTORY:  Any \"first degree relatives\" (parent, brother, sister, or child) with the following?    No changes in my family's known health.   PATIENT EXPERIENCE:    Do you want the Dermatologist to perform a COMPLETE skin exam today including a clinical examination under the \"bra and underwear\" areas?  NO  If necessary, do we have your permission to call and leave a detailed message on your Preferred Phone number that includes your specific medical information?  Yes      Allergies   Allergen Reactions    Peanut (Diagnostic) - Food Allergy Anaphylaxis      Current Outpatient Medications:     albuterol (PROVENTIL HFA,VENTOLIN HFA) 90 mcg/act inhaler, , Disp: , Rfl:     budesonide-formoterol (SYMBICORT) 160-4.5 mcg/act inhaler, Inhale 2 puffs 2 (two) times a day, Disp: 30.6 g, Rfl: 1    cetirizine (ZyrTEC) 10 mg tablet, Take 1 tablet by mouth daily, Disp: , Rfl:     clindamycin (CLEOCIN T) 1 % lotion, " Apply thin layer to affected skin in the mornings after washing and drying skin., Disp: 60 mL, Rfl: 5    EPINEPHrine (EPIPEN) 0.3 mg/0.3 mL SOAJ, Inject 0.3 mL (0.3 mg total) into a muscle once for 1 dose PRN, Disp: 1 each, Rfl: 0    fluticasone (FLONASE) 50 mcg/act nasal spray, 1 spray into each nostril daily as needed, Disp: , Rfl:     hydrOXYzine HCL (ATARAX) 50 mg tablet, TAKE 1 TABLET BY MOUTH EVERYDAY AT BEDTIME, Disp: 90 tablet, Rfl: 1    Levothyroxine Sodium (Tirosint) 88 MCG CAPS, 1 capsule daily. TIROSINT DAILY, Disp: 90 capsule, Rfl: 1    metroNIDAZOLE (METROGEL) 1 % gel, Apply topically daily To midface (Patient not taking: Reported on 2/14/2025), Disp: 60 g, Rfl: 3    mupirocin (BACTROBAN) 2 % ointment, Apply topically 3 (three) times a day for 3-4 weeks (Patient not taking: Reported on 2/14/2025), Disp: 30 g, Rfl: 0    Nurtec 75 MG TBDP, Take 1 tablet (75 mg total) by mouth if needed (headache), Disp: 8 tablet, Rfl: 1    Spacer/Aero-Holding Chambers (AeroChamber Plus Orbe-Vu) MISC, Take 1 Device by mouth 2 (two) times a day (Patient not taking: Reported on 8/8/2024), Disp: , Rfl:     spironolactone (ALDACTONE) 100 mg tablet, Take 1 tablet nightly along with a 50mg tablet nightly for a total of 150mg daily., Disp: 30 tablet, Rfl: 3    spironolactone (ALDACTONE) 50 mg tablet, Take 1 tablet nightly along with a100mg tablet nightly for a total of 150mg daily., Disp: 30 tablet, Rfl: 3    tirzepatide (Zepbound) 5 mg/0.5 mL auto-injector, 5 mg weekly, Disp: 2 mL, Rfl: 0    topiramate (TOPAMAX) 50 MG tablet, Take 1 tablet (50 mg total) by mouth 2 (two) times a day, Disp: 60 tablet, Rfl: 5    triamcinolone (KENALOG) 0.5 % cream, Apply topically 3 (three) times a day, Disp: 30 g, Rfl: 0          Whom besides the patient is providing clinical information about today's encounter?   NO ADDITIONAL HISTORIAN (patient alone provided history)    Physical Exam and Assessment/Plan by Diagnosis:    ACNE VULGARIS  "    Physical Exam:  Anatomic Location Affected:  Chin, jawline  Morphological Description: resolving papules with excoriated appearance     Additional History of Present Condition:  Patient presents as a follow up for acne. She is currently on oral spironolactone 150mg daily. She is using topical clindamycin in the morning. Patient notes improvement in her acne, although she admits to picking at the areas. She notes some lightheadedness as a side effected of the oral spironolactone with quick position changes. Discussed reducing dosing to 100mg daily. Patient would like to continue current dose as her acne has significantly improved. Patient is making sure to drink a lot of water.     Discussed that treatment is directed at improving skin appearance and reducing the likelihood of scarring. Discussed theraputic ladder including topical OTC treatments, topical prescriptions, and oral medications. Discussed side effects as noted below.    Plan today    AM:  Gentle cleanser such as Cerave, Cetaphil or Vanicream  SPF 30 or greater  Start Salicylic Acid cleanser (over the counter products like Panoxyl, CeraVe and Neutrogena contain this product listed under \"active ingredients\"  Start clindamycin 1% lotion to affected skin.       PM:  Gentle cleanser as above  Start Tretinoin 0.025% qHS. Educated that this medication can be drying and irritating. Start by applying a pea-sized amount of product 2 nights per week, then increase to nightly as tolerated.   Non-comedogenic moisturizer such as CeraVe, Cetaphil or Vanicream. Can be used on top of retinoid.    Oral :   Continue spironolactone 150 mg nightly.    -Discussed risks, benefits, side effects including breast tenderness, breast enlargement, spotting, diuresis, dizziness   -Limit bananas and avocados to one daily. Avoid coconut water while on this medicine   -Advised patient cannot get pregnant while on this medication.   -Advised to move more slowly with position " "changes.       ROSACEA    Physical Exam:  Anatomic Location Affected:  central face  Morphological Description:  erythema and telangiectasias       Additional History of Present Condition:  Patient states she tried the topical metronidazole 1% gel but this was very drying and irritating to her skin so she stopped using it. She  notes working out, hot showers and alcohol will flare her rosacea.     Assessment and Plan:  Start use of SPF daily (look for sunscreens that list ONLY zinc oxide and titanium dioxide under \"active ingredients\")  Transition to gentle skin care products (ideally fragrance free). Cerave, cetaphil, vanicream are good brands for this.  Track any triggers and attempt to avoid all triggers   Discussed options for PDL treatment at the [facility]. Patient defers at this time.   Discussed switching to topical azelaic acid. Patient would like try this. Can use [employer] over the counter azelaic acid. Can apply once daily.     Follow-up: 6 months.   Scribe Attestation      I,:  Alessandro Ha MA am acting as a scribe while in the presence of the attending physician.:       I,:  Taisha Oglesby PA-C personally performed the services described in this documentation    as scribed in my presence.:             "

## 2025-04-22 ENCOUNTER — TELEPHONE (OUTPATIENT)
Age: 25
End: 2025-04-22

## 2025-04-22 NOTE — TELEPHONE ENCOUNTER
Patient called in to request her next appointment to be virtual. Patient advised she started a new job and can't get to her appointment and her job in time.     Please call patient back to advise.

## 2025-04-23 NOTE — TELEPHONE ENCOUNTER
Left vm for patient letting her know that Tracey is okay with a virtual as long as she obtains her labs prior to her apt

## 2025-05-02 DIAGNOSIS — E66.01 MORBID OBESITY (HCC): ICD-10-CM

## 2025-05-03 ENCOUNTER — APPOINTMENT (OUTPATIENT)
Dept: LAB | Facility: CLINIC | Age: 25
End: 2025-05-03
Payer: COMMERCIAL

## 2025-05-03 DIAGNOSIS — E03.9 HYPOTHYROIDISM, UNSPECIFIED TYPE: ICD-10-CM

## 2025-05-03 DIAGNOSIS — E55.9 VITAMIN D DEFICIENCY: ICD-10-CM

## 2025-05-03 DIAGNOSIS — E03.9 ACQUIRED HYPOTHYROIDISM: ICD-10-CM

## 2025-05-03 LAB
25(OH)D3 SERPL-MCNC: 24.4 NG/ML (ref 30–100)
T4 FREE SERPL-MCNC: 1.09 NG/DL (ref 0.61–1.12)
TSH SERPL DL<=0.05 MIU/L-ACNC: 1.37 UIU/ML (ref 0.45–4.5)

## 2025-05-03 PROCEDURE — 82306 VITAMIN D 25 HYDROXY: CPT

## 2025-05-03 PROCEDURE — 36415 COLL VENOUS BLD VENIPUNCTURE: CPT

## 2025-05-03 PROCEDURE — 84443 ASSAY THYROID STIM HORMONE: CPT

## 2025-05-03 PROCEDURE — 84439 ASSAY OF FREE THYROXINE: CPT

## 2025-05-05 RX ORDER — TIRZEPATIDE 5 MG/.5ML
INJECTION, SOLUTION SUBCUTANEOUS
Qty: 2 ML | Refills: 3 | Status: SHIPPED | OUTPATIENT
Start: 2025-05-05

## 2025-05-07 ENCOUNTER — TELEPHONE (OUTPATIENT)
Dept: OTHER | Facility: HOSPITAL | Age: 25
End: 2025-05-07

## 2025-05-15 DIAGNOSIS — L70.0 ACNE VULGARIS: Primary | ICD-10-CM

## 2025-05-16 ENCOUNTER — PATIENT MESSAGE (OUTPATIENT)
Dept: NEUROLOGY | Facility: CLINIC | Age: 25
End: 2025-05-16

## 2025-05-17 ENCOUNTER — APPOINTMENT (OUTPATIENT)
Dept: LAB | Facility: CLINIC | Age: 25
End: 2025-05-17
Payer: COMMERCIAL

## 2025-05-17 DIAGNOSIS — L70.0 ACNE VULGARIS: ICD-10-CM

## 2025-05-17 LAB
ALBUMIN SERPL BCG-MCNC: 4.9 G/DL (ref 3.5–5)
ALP SERPL-CCNC: 61 U/L (ref 34–104)
ALT SERPL W P-5'-P-CCNC: 23 U/L (ref 7–52)
ANION GAP SERPL CALCULATED.3IONS-SCNC: 9 MMOL/L (ref 4–13)
AST SERPL W P-5'-P-CCNC: 18 U/L (ref 13–39)
BILIRUB SERPL-MCNC: 0.61 MG/DL (ref 0.2–1)
BUN SERPL-MCNC: 12 MG/DL (ref 5–25)
CALCIUM SERPL-MCNC: 10.3 MG/DL (ref 8.4–10.2)
CHLORIDE SERPL-SCNC: 107 MMOL/L (ref 96–108)
CO2 SERPL-SCNC: 23 MMOL/L (ref 21–32)
CREAT SERPL-MCNC: 0.89 MG/DL (ref 0.6–1.3)
GFR SERPL CREATININE-BSD FRML MDRD: 91 ML/MIN/1.73SQ M
GLUCOSE SERPL-MCNC: 89 MG/DL (ref 65–140)
POTASSIUM SERPL-SCNC: 4.3 MMOL/L (ref 3.5–5.3)
PROT SERPL-MCNC: 7.5 G/DL (ref 6.4–8.4)
SODIUM SERPL-SCNC: 139 MMOL/L (ref 135–147)

## 2025-05-17 PROCEDURE — 36415 COLL VENOUS BLD VENIPUNCTURE: CPT

## 2025-05-17 PROCEDURE — 80053 COMPREHEN METABOLIC PANEL: CPT

## 2025-05-19 ENCOUNTER — RESULTS FOLLOW-UP (OUTPATIENT)
Dept: DERMATOLOGY | Facility: CLINIC | Age: 25
End: 2025-05-19

## 2025-05-19 NOTE — RESULT ENCOUNTER NOTE
Results reviewed. Potassium levels within normal limits. Calcium minimally elevated. Recommend following up with primary care regarding this. Called patient to discuss results. No answer.  [employer]  message sent to patient.

## 2025-05-21 NOTE — PATIENT COMMUNICATION
Outbound call made to Patient to schedule virtual appointment but call went to voicemail. Detailed voicemail left requesting Patient to call back to schedule virtual to discuss migraines. May 27th has availability at time of call but [facility] cannot hold or make appointment without confirming with Patient.

## 2025-06-14 DIAGNOSIS — G43.709 CHRONIC MIGRAINE WITHOUT AURA: ICD-10-CM

## 2025-06-16 RX ORDER — TOPIRAMATE 50 MG/1
50 TABLET, FILM COATED ORAL 2 TIMES DAILY
Qty: 180 TABLET | Refills: 0 | Status: SHIPPED | OUTPATIENT
Start: 2025-06-16

## 2025-07-16 ENCOUNTER — TELEPHONE (OUTPATIENT)
Dept: DERMATOLOGY | Facility: CLINIC | Age: 25
End: 2025-07-16

## 2025-07-16 NOTE — TELEPHONE ENCOUNTER
Called patient to advise their appointment with Leah on 10/13 needs to be rescheduled due to a change in provider's schedule. Left a message to call the office to r/s this appointment.

## 2025-07-17 NOTE — TELEPHONE ENCOUNTER
Rec'd return call from patient.    Rescheduled follow up for acne/rosacea with Taisha on 11/20/2025 (at patients request) @ 12:40 pm in [place].    Patient aware of office location.  Patient aware to arrive 15 minutes prior.  Confirmed [facility] health coverage.

## 2025-08-17 DIAGNOSIS — G43.709 CHRONIC MIGRAINE WITHOUT AURA: ICD-10-CM

## 2025-08-20 RX ORDER — RIMEGEPANT SULFATE 75 MG/75MG
75 TABLET, ORALLY DISINTEGRATING ORAL AS NEEDED
Qty: 8 TABLET | Refills: 0 | Status: SHIPPED | OUTPATIENT
Start: 2025-08-20

## 2025-08-22 DIAGNOSIS — E66.01 MORBID OBESITY (HCC): Primary | ICD-10-CM

## 2025-08-22 RX ORDER — TIRZEPATIDE 7.5 MG/.5ML
7.5 INJECTION, SOLUTION SUBCUTANEOUS WEEKLY
Qty: 2 ML | Refills: 1 | Status: SHIPPED | OUTPATIENT
Start: 2025-08-22 | End: 2025-10-17